# Patient Record
Sex: FEMALE | Race: WHITE | NOT HISPANIC OR LATINO | URBAN - METROPOLITAN AREA
[De-identification: names, ages, dates, MRNs, and addresses within clinical notes are randomized per-mention and may not be internally consistent; named-entity substitution may affect disease eponyms.]

---

## 2017-05-05 ENCOUNTER — APPOINTMENT (OUTPATIENT)
Dept: URBAN - METROPOLITAN AREA CLINIC 200 | Age: 57
Setting detail: DERMATOLOGY
End: 2017-05-11

## 2017-05-05 DIAGNOSIS — L57.8 OTHER SKIN CHANGES DUE TO CHRONIC EXPOSURE TO NONIONIZING RADIATION: ICD-10-CM

## 2017-05-05 DIAGNOSIS — L82.0 INFLAMED SEBORRHEIC KERATOSIS: ICD-10-CM

## 2017-05-05 DIAGNOSIS — L57.0 ACTINIC KERATOSIS: ICD-10-CM

## 2017-05-05 DIAGNOSIS — L72.0 EPIDERMAL CYST: ICD-10-CM

## 2017-05-05 PROCEDURE — OTHER COUNSELING: OTHER

## 2017-05-05 PROCEDURE — OTHER BENIGN DESTRUCTION COSMETIC: OTHER

## 2017-05-05 PROCEDURE — 17000 DESTRUCT PREMALG LESION: CPT

## 2017-05-05 PROCEDURE — OTHER LIQUID NITROGEN (COSMETIC): OTHER

## 2017-05-05 PROCEDURE — 99213 OFFICE O/P EST LOW 20 MIN: CPT | Mod: 25

## 2017-05-05 PROCEDURE — OTHER LIQUID NITROGEN: OTHER

## 2017-05-05 ASSESSMENT — LOCATION SIMPLE DESCRIPTION DERM
LOCATION SIMPLE: UPPER BACK
LOCATION SIMPLE: LEFT CHEEK
LOCATION SIMPLE: GLABELLA
LOCATION SIMPLE: RIGHT UPPER BACK
LOCATION SIMPLE: LEFT FOREHEAD
LOCATION SIMPLE: RIGHT SCALP

## 2017-05-05 ASSESSMENT — LOCATION ZONE DERM
LOCATION ZONE: FACE
LOCATION ZONE: SCALP
LOCATION ZONE: TRUNK
LOCATION ZONE: TRUNK

## 2017-05-05 ASSESSMENT — LOCATION DETAILED DESCRIPTION DERM
LOCATION DETAILED: RIGHT CENTRAL FRONTAL SCALP
LOCATION DETAILED: LEFT INFERIOR CENTRAL MALAR CHEEK
LOCATION DETAILED: LEFT INFERIOR MEDIAL FOREHEAD
LOCATION DETAILED: GLABELLA
LOCATION DETAILED: INFERIOR THORACIC SPINE
LOCATION DETAILED: RIGHT SUPERIOR MEDIAL UPPER BACK

## 2017-05-05 NOTE — PROCEDURE: LIQUID NITROGEN (COSMETIC)
Total Number Of Lesions Treated: 1
Detail Level: Zone
Consent: The patient's consent was obtained including but not limited to risks of crusting, scabbing, blistering, scarring, darker or lighter pigmentary change, recurrence, incomplete removal and infection.
Duration Of Freeze Thaw-Cycle (Seconds): 0
Number Of Freeze-Thaw Cycles: 2 freeze-thaw cycles
Price (Use Numbers Only, No Special Characters Or $): 75
Post-Care Instructions: I reviewed with the patient in detail post-care instructions. Patient is to wear sunprotection, and avoid picking at any of the treated lesions. Pt may apply Vaseline to crusted or scabbing areas.

## 2017-05-05 NOTE — PROCEDURE: LIQUID NITROGEN
Number Of Freeze-Thaw Cycles: 2 freeze-thaw cycles
Detail Level: Detailed
Render Post-Care Instructions In Note?: no
Consent: The patient's consent was obtained including but not limited to risks of crusting, scabbing, blistering, scarring, darker or lighter pigmentary change, recurrence, incomplete removal and infection.
Post-Care Instructions: I reviewed with the patient in detail post-care instructions. Patient is to wear sunprotection, and avoid picking at any of the treated lesions. Pt may apply Vaseline to crusted or scabbing areas.
Duration Of Freeze Thaw-Cycle (Seconds): 10

## 2017-05-05 NOTE — PROCEDURE: BENIGN DESTRUCTION COSMETIC
Anesthesia Volume In Cc: 0
Detail Level: Simple
Consent: The patient's consent was obtained including but not limited to risks of crusting, scabbing, blistering, scarring, darker or lighter pigmentary change, recurrence, incomplete removal and infection.
Post-Care Instructions: I reviewed with the patient in detail post-care instructions. Patient is to wear sunprotection, and avoid picking at any of the treated lesions. Pt may apply Vaseline to crusted or scabbing areas.

## 2018-06-01 ENCOUNTER — APPOINTMENT (OUTPATIENT)
Dept: URBAN - METROPOLITAN AREA CLINIC 200 | Age: 58
Setting detail: DERMATOLOGY
End: 2018-06-13

## 2018-06-01 DIAGNOSIS — L57.8 OTHER SKIN CHANGES DUE TO CHRONIC EXPOSURE TO NONIONIZING RADIATION: ICD-10-CM

## 2018-06-01 DIAGNOSIS — B07.8 OTHER VIRAL WARTS: ICD-10-CM

## 2018-06-01 DIAGNOSIS — D18.0 HEMANGIOMA: ICD-10-CM

## 2018-06-01 PROBLEM — D18.01 HEMANGIOMA OF SKIN AND SUBCUTANEOUS TISSUE: Status: ACTIVE | Noted: 2018-06-01

## 2018-06-01 PROBLEM — L57.0 ACTINIC KERATOSIS: Status: ACTIVE | Noted: 2018-06-01

## 2018-06-01 PROCEDURE — 17110 DESTRUCT B9 LESION 1-14: CPT

## 2018-06-01 PROCEDURE — OTHER LIQUID NITROGEN: OTHER

## 2018-06-01 PROCEDURE — OTHER REASSURANCE: OTHER

## 2018-06-01 PROCEDURE — OTHER COUNSELING: OTHER

## 2018-06-01 PROCEDURE — 99213 OFFICE O/P EST LOW 20 MIN: CPT | Mod: 25

## 2018-06-01 ASSESSMENT — LOCATION DETAILED DESCRIPTION DERM
LOCATION DETAILED: LEFT INDEX FINGER DISTAL INTERPHALANGEAL JOINT
LOCATION DETAILED: MONS
LOCATION DETAILED: RIGHT LABIA MAJORA
LOCATION DETAILED: LEFT MEDIAL SUPERIOR CHEST

## 2018-06-01 ASSESSMENT — LOCATION ZONE DERM
LOCATION ZONE: TRUNK
LOCATION ZONE: VULVA
LOCATION ZONE: FINGER
LOCATION ZONE: VULVA

## 2018-06-01 ASSESSMENT — LOCATION SIMPLE DESCRIPTION DERM
LOCATION SIMPLE: CHEST
LOCATION SIMPLE: LEFT INDEX FINGER
LOCATION SIMPLE: VULVA
LOCATION SIMPLE: VULVA

## 2018-06-01 NOTE — PROCEDURE: LIQUID NITROGEN
Post-Care Instructions: I reviewed with the patient in detail post-care instructions. Patient is to wear sunprotection, and avoid picking at any of the treated lesions. Pt may apply Vaseline to crusted or scabbing areas.
Render Post-Care Instructions In Note?: no
Include Z78.9 (Other Specified Conditions Influencing Health Status) As An Associated Diagnosis?: Yes
Detail Level: Detailed
Number Of Freeze-Thaw Cycles: 2 freeze-thaw cycles
Medical Necessity Information: It is in your best interest to select a reason for this procedure from the list below. All of these items fulfill various CMS LCD requirements except the new and changing color options.
Pared With?: Dermablade
Consent: The patient's consent was obtained including but not limited to risks of crusting, scabbing, blistering, scarring, darker or lighter pigmentary change, recurrence, incomplete removal and infection.
Medical Necessity Clause: This procedure was medically necessary because the lesions that were treated were: causing pain

## 2018-08-07 ENCOUNTER — TRANSCRIBE ORDERS (OUTPATIENT)
Dept: SCHEDULING | Age: 58
End: 2018-08-07

## 2018-08-07 DIAGNOSIS — R10.814 LEFT LOWER QUADRANT ABDOMINAL TENDERNESS: Primary | ICD-10-CM

## 2018-08-07 DIAGNOSIS — Z12.31 ENCOUNTER FOR SCREENING MAMMOGRAM FOR MALIGNANT NEOPLASM OF BREAST: ICD-10-CM

## 2018-08-07 DIAGNOSIS — R10.32 LEFT LOWER QUADRANT PAIN: ICD-10-CM

## 2018-08-16 ENCOUNTER — HOSPITAL ENCOUNTER (OUTPATIENT)
Dept: RADIOLOGY | Facility: HOSPITAL | Age: 58
Discharge: HOME | End: 2018-08-16
Attending: OBSTETRICS & GYNECOLOGY
Payer: COMMERCIAL

## 2018-08-16 DIAGNOSIS — R10.814 LEFT LOWER QUADRANT ABDOMINAL TENDERNESS: ICD-10-CM

## 2018-08-16 DIAGNOSIS — R10.32 LEFT LOWER QUADRANT PAIN: ICD-10-CM

## 2018-08-16 DIAGNOSIS — Z12.31 ENCOUNTER FOR SCREENING MAMMOGRAM FOR MALIGNANT NEOPLASM OF BREAST: ICD-10-CM

## 2018-08-16 PROCEDURE — 76856 US EXAM PELVIC COMPLETE: CPT

## 2018-08-16 PROCEDURE — 77067 SCR MAMMO BI INCL CAD: CPT

## 2018-08-21 ENCOUNTER — TRANSCRIBE ORDERS (OUTPATIENT)
Dept: SCHEDULING | Age: 58
End: 2018-08-21

## 2018-08-21 DIAGNOSIS — R92.8 OTHER ABNORMAL AND INCONCLUSIVE FINDINGS ON DIAGNOSTIC IMAGING OF BREAST: Primary | ICD-10-CM

## 2018-08-22 ENCOUNTER — HOSPITAL ENCOUNTER (OUTPATIENT)
Dept: RADIOLOGY | Facility: HOSPITAL | Age: 58
Discharge: HOME | End: 2018-08-22
Attending: RADIOLOGY
Payer: COMMERCIAL

## 2018-08-22 DIAGNOSIS — R92.8 OTHER ABNORMAL AND INCONCLUSIVE FINDINGS ON DIAGNOSTIC IMAGING OF BREAST: ICD-10-CM

## 2018-08-22 PROCEDURE — 76642 ULTRASOUND BREAST LIMITED: CPT | Mod: RT

## 2018-08-22 PROCEDURE — G0279 TOMOSYNTHESIS, MAMMO: HCPCS | Mod: RT

## 2018-10-05 ENCOUNTER — APPOINTMENT (OUTPATIENT)
Dept: URBAN - METROPOLITAN AREA CLINIC 200 | Age: 58
Setting detail: DERMATOLOGY
End: 2018-10-10

## 2018-10-05 DIAGNOSIS — S1092XA BLISTER OF FACE, NECK, AND SCALP EXCEPT EYE, WITHOUT MENTION OF INFECTION: ICD-10-CM

## 2018-10-05 DIAGNOSIS — S0032XA BLISTER OF FACE, NECK, AND SCALP EXCEPT EYE, WITHOUT MENTION OF INFECTION: ICD-10-CM

## 2018-10-05 DIAGNOSIS — S0002XA BLISTER OF FACE, NECK, AND SCALP EXCEPT EYE, WITHOUT MENTION OF INFECTION: ICD-10-CM

## 2018-10-05 DIAGNOSIS — S00429A BLISTER OF FACE, NECK, AND SCALP EXCEPT EYE, WITHOUT MENTION OF INFECTION: ICD-10-CM

## 2018-10-05 DIAGNOSIS — S1012XA BLISTER OF FACE, NECK, AND SCALP EXCEPT EYE, WITHOUT MENTION OF INFECTION: ICD-10-CM

## 2018-10-05 DIAGNOSIS — B07.8 OTHER VIRAL WARTS: ICD-10-CM

## 2018-10-05 DIAGNOSIS — S00522A BLISTER OF FACE, NECK, AND SCALP EXCEPT EYE, WITHOUT MENTION OF INFECTION: ICD-10-CM

## 2018-10-05 DIAGNOSIS — I78.8 OTHER DISEASES OF CAPILLARIES: ICD-10-CM

## 2018-10-05 DIAGNOSIS — S00521A BLISTER OF FACE, NECK, AND SCALP EXCEPT EYE, WITHOUT MENTION OF INFECTION: ICD-10-CM

## 2018-10-05 DIAGNOSIS — S0092XA BLISTER OF FACE, NECK, AND SCALP EXCEPT EYE, WITHOUT MENTION OF INFECTION: ICD-10-CM

## 2018-10-05 PROBLEM — S60.420A BLISTER (NONTHERMAL) OF RIGHT INDEX FINGER, INITIAL ENCOUNTER: Status: ACTIVE | Noted: 2018-10-05

## 2018-10-05 PROCEDURE — OTHER BENIGN DESTRUCTION COSMETIC MULTI: OTHER

## 2018-10-05 PROCEDURE — OTHER COUNSELING: OTHER

## 2018-10-05 PROCEDURE — OTHER REASSURANCE: OTHER

## 2018-10-05 ASSESSMENT — LOCATION DETAILED DESCRIPTION DERM
LOCATION DETAILED: RIGHT DISTAL PALMAR INDEX FINGER
LOCATION DETAILED: RIGHT MEDIAL MALAR CHEEK
LOCATION DETAILED: RIGHT SUPERIOR LATERAL BUCCAL CHEEK
LOCATION DETAILED: RIGHT INFERIOR MEDIAL MALAR CHEEK
LOCATION DETAILED: LEFT INFERIOR CENTRAL MALAR CHEEK
LOCATION DETAILED: RIGHT CHIN
LOCATION DETAILED: LEFT CENTRAL MALAR CHEEK
LOCATION DETAILED: NASAL DORSUM
LOCATION DETAILED: LEFT NASAL ALA
LOCATION DETAILED: RIGHT CENTRAL MALAR CHEEK
LOCATION DETAILED: GLABELLA
LOCATION DETAILED: LEFT INDEX FINGER DISTAL INTERPHALANGEAL JOINT

## 2018-10-05 ASSESSMENT — LOCATION SIMPLE DESCRIPTION DERM
LOCATION SIMPLE: RIGHT CHEEK
LOCATION SIMPLE: LEFT CHEEK
LOCATION SIMPLE: CHIN
LOCATION SIMPLE: RIGHT INDEX FINGER
LOCATION SIMPLE: NOSE
LOCATION SIMPLE: LEFT INDEX FINGER
LOCATION SIMPLE: LEFT NOSE
LOCATION SIMPLE: GLABELLA

## 2018-10-05 ASSESSMENT — LOCATION ZONE DERM
LOCATION ZONE: NOSE
LOCATION ZONE: FINGER
LOCATION ZONE: FACE

## 2018-10-05 NOTE — PROCEDURE: BENIGN DESTRUCTION COSMETIC MULTI
Post-Care Instructions: I reviewed with the patient in detail post-care instructions. Patient is to wear sunprotection, and avoid picking at any of the treated lesions. Pt may apply Vaseline to crusted or scabbing areas.
Price (Use Numbers Only, No Special Characters Or $): 75
Total Number Of Lesions Treated: 10
Consent: The patient's consent was obtained including but not limited to risks of crusting, scabbing, blistering, scarring, darker or lighter pigmentary change, recurrence, incomplete removal and infection.
Detail Level: Zone
Anesthesia Volume In Cc: 0

## 2018-10-05 NOTE — PROCEDURE: COUNSELING
Detail Level: Detailed
Patient Specific Counseling (Will Not Stick From Patient To Patient): Pared top layer

## 2019-02-04 ENCOUNTER — TRANSCRIBE ORDERS (OUTPATIENT)
Dept: SCHEDULING | Age: 59
End: 2019-02-04

## 2019-02-04 DIAGNOSIS — R92.8 OTHER ABNORMAL AND INCONCLUSIVE FINDINGS ON DIAGNOSTIC IMAGING OF BREAST: Primary | ICD-10-CM

## 2019-02-18 ENCOUNTER — TRANSCRIBE ORDERS (OUTPATIENT)
Dept: RADIOLOGY | Age: 59
End: 2019-02-18

## 2019-02-18 ENCOUNTER — HOSPITAL ENCOUNTER (OUTPATIENT)
Dept: RADIOLOGY | Age: 59
Discharge: HOME | End: 2019-02-18
Attending: OBSTETRICS & GYNECOLOGY
Payer: COMMERCIAL

## 2019-02-18 ENCOUNTER — HOSPITAL ENCOUNTER (OUTPATIENT)
Dept: RADIOLOGY | Age: 59
Discharge: HOME | End: 2019-02-18
Attending: RADIOLOGY
Payer: COMMERCIAL

## 2019-02-18 DIAGNOSIS — R92.8 OTHER ABNORMAL AND INCONCLUSIVE FINDINGS ON DIAGNOSTIC IMAGING OF BREAST: Primary | ICD-10-CM

## 2019-02-18 DIAGNOSIS — R92.8 OTHER ABNORMAL AND INCONCLUSIVE FINDINGS ON DIAGNOSTIC IMAGING OF BREAST: ICD-10-CM

## 2019-02-18 PROCEDURE — 76642 ULTRASOUND BREAST LIMITED: CPT | Mod: RT

## 2019-02-18 PROCEDURE — 77065 DX MAMMO INCL CAD UNI: CPT | Mod: RT

## 2019-05-01 ENCOUNTER — OFFICE VISIT (OUTPATIENT)
Dept: FAMILY MEDICINE | Facility: CLINIC | Age: 59
End: 2019-05-01
Payer: COMMERCIAL

## 2019-05-01 ENCOUNTER — APPOINTMENT (OUTPATIENT)
Dept: LAB | Age: 59
End: 2019-05-01
Attending: FAMILY MEDICINE
Payer: COMMERCIAL

## 2019-05-01 VITALS
OXYGEN SATURATION: 98 % | HEART RATE: 66 BPM | SYSTOLIC BLOOD PRESSURE: 112 MMHG | TEMPERATURE: 97.6 F | DIASTOLIC BLOOD PRESSURE: 68 MMHG | WEIGHT: 190.8 LBS

## 2019-05-01 DIAGNOSIS — M25.50 ARTHRALGIA, UNSPECIFIED JOINT: ICD-10-CM

## 2019-05-01 DIAGNOSIS — R59.1 LYMPHADENOPATHY: ICD-10-CM

## 2019-05-01 DIAGNOSIS — Z11.59 NEED FOR HEPATITIS C SCREENING TEST: ICD-10-CM

## 2019-05-01 DIAGNOSIS — R53.83 FATIGUE, UNSPECIFIED TYPE: ICD-10-CM

## 2019-05-01 DIAGNOSIS — R53.83 FATIGUE, UNSPECIFIED TYPE: Primary | ICD-10-CM

## 2019-05-01 PROBLEM — G43.909 MIGRAINE HEADACHE: Status: ACTIVE | Noted: 2019-05-01

## 2019-05-01 LAB
ALBUMIN SERPL-MCNC: 4.1 G/DL (ref 3.4–5)
ALP SERPL-CCNC: 88 IU/L (ref 35–126)
ALT SERPL-CCNC: 19 IU/L (ref 11–54)
ANION GAP SERPL CALC-SCNC: 13 MEQ/L (ref 3–15)
AST SERPL-CCNC: 22 IU/L (ref 15–41)
BASOPHILS # BLD: 0 K/UL (ref 0.01–0.1)
BASOPHILS NFR BLD: 0 %
BILIRUB SERPL-MCNC: 0.7 MG/DL (ref 0.3–1.2)
BUN SERPL-MCNC: 9 MG/DL (ref 8–20)
CALCIUM SERPL-MCNC: 9.5 MG/DL (ref 8.9–10.3)
CHLORIDE SERPL-SCNC: 101 MEQ/L (ref 98–109)
CO2 SERPL-SCNC: 23 MEQ/L (ref 22–32)
CREAT SERPL-MCNC: 0.9 MG/DL
DIFFERENTIAL METHOD BLD: ABNORMAL
EOSINOPHIL # BLD: 0.06 K/UL (ref 0.04–0.36)
EOSINOPHIL NFR BLD: 1 %
ERYTHROCYTE [DISTWIDTH] IN BLOOD BY AUTOMATED COUNT: 13.2 % (ref 11.7–14.4)
ERYTHROCYTE [SEDIMENTATION RATE] IN BLOOD BY WESTERGREN METHOD: 33 MM/HR
GFR SERPL CREATININE-BSD FRML MDRD: >60 ML/MIN/1.73M*2
GLUCOSE SERPL-MCNC: 86 MG/DL (ref 70–99)
HCT VFR BLDCO AUTO: 43.7 %
HGB BLD-MCNC: 14.3 G/DL
LYMPHOCYTES # BLD: 1.8 K/UL (ref 1.2–3.5)
LYMPHOCYTES NFR BLD: 31 %
MCH RBC QN AUTO: 28.9 PG (ref 28–33.2)
MCHC RBC AUTO-ENTMCNC: 32.7 G/DL (ref 32.2–35.5)
MCV RBC AUTO: 88.3 FL (ref 83–98)
MONOCYTES # BLD: 0.35 K/UL (ref 0.28–0.8)
MONOCYTES NFR BLD: 6 %
NEUTS BAND # BLD: 3.6 K/UL (ref 1.7–7)
NEUTS SEG NFR BLD: 62 %
PDW BLD AUTO: 10.2 FL (ref 9.4–12.3)
PLAT MORPH BLD: NORMAL
PLATELET # BLD AUTO: 249 K/UL
PLATELET # BLD EST: ABNORMAL 10*3/UL
POTASSIUM SERPL-SCNC: 5 MEQ/L (ref 3.6–5.1)
PROT SERPL-MCNC: 6.3 G/DL (ref 6–8.2)
RBC # BLD AUTO: 4.95 M/UL (ref 3.93–5.22)
RBC MORPH BLD: NORMAL
SODIUM SERPL-SCNC: 137 MEQ/L (ref 136–144)
TSH SERPL DL<=0.05 MIU/L-ACNC: 0.18 MIU/L (ref 0.34–5.6)
WBC # BLD AUTO: 5.81 K/UL

## 2019-05-01 PROCEDURE — 99214 OFFICE O/P EST MOD 30 MIN: CPT | Performed by: FAMILY MEDICINE

## 2019-05-01 PROCEDURE — 86803 HEPATITIS C AB TEST: CPT

## 2019-05-01 PROCEDURE — 36415 COLL VENOUS BLD VENIPUNCTURE: CPT

## 2019-05-01 PROCEDURE — 84443 ASSAY THYROID STIM HORMONE: CPT

## 2019-05-01 PROCEDURE — 80053 COMPREHEN METABOLIC PANEL: CPT

## 2019-05-01 PROCEDURE — 85652 RBC SED RATE AUTOMATED: CPT

## 2019-05-01 PROCEDURE — 85025 COMPLETE CBC W/AUTO DIFF WBC: CPT

## 2019-05-01 RX ORDER — SUMATRIPTAN SUCCINATE 50 MG/1
50 TABLET ORAL
COMMUNITY
Start: 2017-08-19 | End: 2019-05-01

## 2019-05-01 RX ORDER — ERGOCALCIFEROL 1.25 MG/1
CAPSULE ORAL
COMMUNITY
Start: 2019-01-29 | End: 2020-08-13

## 2019-05-01 RX ORDER — LEVOTHYROXINE SODIUM 75 UG/1
75 TABLET ORAL EVERY OTHER DAY
COMMUNITY

## 2019-05-01 RX ORDER — ACETAMINOPHEN 500 MG
TABLET ORAL
COMMUNITY
Start: 2016-07-23 | End: 2020-08-13

## 2019-05-01 ASSESSMENT — ENCOUNTER SYMPTOMS
FEVER: 0
VOICE CHANGE: 0
HEADACHES: 0
ADENOPATHY: 0
CONSTIPATION: 0
SLEEP DISTURBANCE: 0
CHEST TIGHTNESS: 0
LIGHT-HEADEDNESS: 0
FATIGUE: 1
DYSURIA: 0
AGITATION: 0
COUGH: 0
SHORTNESS OF BREATH: 0
NUMBNESS: 0
BACK PAIN: 0
SORE THROAT: 0
NAUSEA: 0
PALPITATIONS: 0
CHILLS: 0
NECK STIFFNESS: 0
ARTHRALGIAS: 1
NECK PAIN: 0
VOMITING: 0
DIFFICULTY URINATING: 0
DYSPHORIC MOOD: 0
ABDOMINAL PAIN: 0
WEAKNESS: 0
NERVOUS/ANXIOUS: 0
DIARRHEA: 0
DIAPHORESIS: 0
WHEEZING: 0
RHINORRHEA: 0
APPETITE CHANGE: 0

## 2019-05-01 NOTE — PROGRESS NOTES
[facility] at [facility]     Reason for visit:   Chief Complaint   Patient presents with   • Swollen Glands - facial puffiness on right side     feeling bloated, hurts to move neck, muscle aches          HPI  Hilda Webster is a 59 y.o. female  Multiple vague complaints- 1 for the last week has been feeling fatigued- easily winded more than usual- feels tired overall and has been achy in general   today noted swelling under rt side of chin on neck - felt feverish in the AM temp was 98.9  Hands have been achy in general but not today- had hx inflammatory OA.           Review of Systems   Constitutional: Positive for fatigue. Negative for appetite change, chills, diaphoresis and fever.   HENT: Negative for congestion, rhinorrhea, sore throat, tinnitus and voice change.    Respiratory: Negative for cough, chest tightness, shortness of breath and wheezing.    Cardiovascular: Negative for chest pain, palpitations and leg swelling.   Gastrointestinal: Negative for abdominal pain, constipation, diarrhea, nausea and vomiting.   Genitourinary: Negative for difficulty urinating, dysuria and urgency.   Musculoskeletal: Positive for arthralgias. Negative for back pain, neck pain and neck stiffness.   Skin: Negative for rash.   Neurological: Negative for syncope, weakness, light-headedness, numbness and headaches.   Hematological: Negative for adenopathy.   Psychiatric/Behavioral: Negative for agitation, dysphoric mood and sleep disturbance. The patient is not nervous/anxious.       The following have been reviewed and updated as appropriate in this visit:  Allergies  Meds  Problems         No past medical history on file.  Past Surgical History:   Procedure Laterality Date   •  SECTION     • ENDOMETRIAL ABLATION     • THYROIDECTOMY       Family History   Problem Relation Age of Onset   • Cervical cancer Mother    • Diabetes Father    • No Known Problems Sister    • Uterine cancer Paternal Grandmother     • Lung cancer Paternal Grandfather    • Migraines Sister      Social History   Substance Use Topics   • Smoking status: Former Smoker   • Smokeless tobacco: Never Used   • Alcohol use Yes      Comment: occasionally       Allergies   Allergen Reactions   • Ibuprofen Hives   • Naproxen Hives     No current outpatient prescriptions on file prior to visit.     No current facility-administered medications on file prior to visit.       Objective   Vitals:    05/01/19 1201   BP: 112/68   BP Location: Left upper arm   Patient Position: Sitting   Pulse: 66   Temp: 36.4 °C (97.6 °F)  Comment: Pt was drinking ice tea   TempSrc: Oral   SpO2: 98%   Weight: 86.5 kg (190 lb 12.8 oz)     Physical Exam   Constitutional: She is oriented to person, place, and time. She appears well-developed and well-nourished. No distress.   HENT:   Head: Normocephalic and atraumatic.   Right Ear: Tympanic membrane, external ear and ear canal normal.   Left Ear: Tympanic membrane, external ear and ear canal normal.   Nose: Nose normal.   Mouth/Throat: Oropharynx is clear and moist. No oropharyngeal exudate.   Eyes: Pupils are equal, round, and reactive to light. Conjunctivae and EOM are normal. Right eye exhibits no discharge. Left eye exhibits no discharge. No scleral icterus.   Neck: Normal range of motion. Neck supple. No JVD present. No thyromegaly present.   Cardiovascular: Normal rate, regular rhythm, normal heart sounds and intact distal pulses.  Exam reveals no gallop, no friction rub and no decreased pulses.    No murmur heard.  No carotid bruits noted   Pulmonary/Chest: Effort normal and breath sounds normal. No respiratory distress. She has no wheezes. She has no rales. She exhibits no tenderness.   Abdominal: Soft. She exhibits no distension and no mass. There is no tenderness. There is no rebound and no guarding. No hernia.   Musculoskeletal: Normal range of motion. She exhibits no edema, tenderness or deformity.   Lymphadenopathy:      She has no cervical adenopathy.   Neurological: She is alert and oriented to person, place, and time. She has normal reflexes. She displays normal reflexes. No cranial nerve deficit or sensory deficit. She exhibits normal muscle tone. Coordination and gait normal.   Skin: Skin is warm and dry. Capillary refill takes less than 2 seconds. No lesion and no rash noted. She is not diaphoretic. No erythema. No pallor.   Psychiatric: She has a normal mood and affect. Her behavior is normal.   Vitals reviewed.               Assessment     Visit Diagnosis    Problem List Items Addressed This Visit     None      Visit Diagnoses     Fatigue, unspecified type    -  Primary    Relevant Orders    CBC and Differential (Completed)    TSH 3rd Generation    Comprehensive metabolic panel    Lymphadenopathy        Relevant Orders    CBC and Differential (Completed)    Sedimentation rate (Completed)    Arthralgia, unspecified joint        Relevant Orders    CBC and Differential (Completed)    Sedimentation rate (Completed)    Need for hepatitis C screening test        Relevant Orders    Hepatitis C antibody      likely viral illness  Will check labs as ordered  She should f/u if sympt cont'd or if they evolve or change into additional sympt  Based on her hx of inflammatory arthritis, should f/u with rheumatology         Return if symptoms worsen or fail to improve.    Patient has been educated on the risks, benefits, and side effects of all medication.     Tony Hill, DO  5/1/2019

## 2019-05-02 ENCOUNTER — TELEPHONE (OUTPATIENT)
Dept: FAMILY MEDICINE | Facility: CLINIC | Age: 59
End: 2019-05-02

## 2019-05-02 LAB — HCV AB SER QL: NONREACTIVE

## 2019-05-02 NOTE — TELEPHONE ENCOUNTER
----- Message from Tony Hill, DO sent at 5/2/2019  1:00 PM EDT -----  pls advise- labs overall look ok- TSH is low  Which she can discuss with her endocrinologist- pls forward result  Slight elevateion of her sed rate which can be from inflammation (arthritis)   otherwise no other abn findings- should self resolve- f/u with PCP if not improving in the next week or so - sooner prn

## 2019-05-03 ENCOUNTER — TELEPHONE (OUTPATIENT)
Dept: FAMILY MEDICINE | Facility: CLINIC | Age: 59
End: 2019-05-03

## 2019-05-03 NOTE — TELEPHONE ENCOUNTER
----- Message from Tony Hill DO sent at 5/3/2019 11:39 AM EDT -----  Regarding: FW: Test Results Question  Contact: [phone]      ----- Message -----  From: Deya Markham RN  Sent: 5/3/2019  10:57 AM  To: Tony Hill DO  Subject: FW: Test Results Question                            ----- Message -----  From: Hilda Webster  Sent: 5/3/2019  10:56 AM  To: [facility]  Subject: Test Results Question                            Dr. Hill, I received a voicemail message from your nurse telling me that my T3 was low.  I called back and Kristin gave me the results of the test which was .18.  I talked to Dr. Abreu, my endocrinologist, who was able to get the results from [facility] and she told me that my TSH was low and that T3 wasn't included in the test.  This is a big difference.  It turns out I'm hyperthyroid.  I don't know how 2 people were able to talk to me about a T3 result and neither one said TSH.  I would appreciate it if you can assure me that all notes in my file correlate with low TSH and not T3.  Also, I have been unable to access my lab results on [facility].  Do you have to release them first in order for me to have access?  Thank you for looking into this.

## 2019-08-27 ENCOUNTER — TELEPHONE (OUTPATIENT)
Dept: FAMILY MEDICINE | Facility: CLINIC | Age: 59
End: 2019-08-27

## 2019-08-30 ENCOUNTER — TRANSCRIBE ORDERS (OUTPATIENT)
Dept: SCHEDULING | Age: 59
End: 2019-08-30

## 2019-08-30 DIAGNOSIS — Z87.898 PERSONAL HISTORY OF OTHER SPECIFIED CONDITIONS: Primary | ICD-10-CM

## 2019-09-09 ENCOUNTER — APPOINTMENT (OUTPATIENT)
Dept: URBAN - METROPOLITAN AREA CLINIC 200 | Age: 59
Setting detail: DERMATOLOGY
End: 2019-09-11

## 2019-09-09 DIAGNOSIS — L82.0 INFLAMED SEBORRHEIC KERATOSIS: ICD-10-CM

## 2019-09-09 DIAGNOSIS — L57.8 OTHER SKIN CHANGES DUE TO CHRONIC EXPOSURE TO NONIONIZING RADIATION: ICD-10-CM

## 2019-09-09 PROBLEM — D23.5 OTHER BENIGN NEOPLASM OF SKIN OF TRUNK: Status: ACTIVE | Noted: 2019-09-09

## 2019-09-09 PROCEDURE — 99213 OFFICE O/P EST LOW 20 MIN: CPT

## 2019-09-09 PROCEDURE — OTHER REASSURANCE: OTHER

## 2019-09-09 PROCEDURE — OTHER COUNSELING: OTHER

## 2019-09-09 PROCEDURE — OTHER MIPS QUALITY: OTHER

## 2019-09-09 ASSESSMENT — LOCATION SIMPLE DESCRIPTION DERM
LOCATION SIMPLE: CHEST
LOCATION SIMPLE: LEFT BREAST

## 2019-09-09 ASSESSMENT — LOCATION DETAILED DESCRIPTION DERM
LOCATION DETAILED: LEFT MEDIAL BREAST 7-8:00 REGION
LOCATION DETAILED: LEFT MEDIAL SUPERIOR CHEST

## 2019-09-09 ASSESSMENT — LOCATION ZONE DERM: LOCATION ZONE: TRUNK

## 2019-09-09 NOTE — PROCEDURE: MIPS QUALITY
Detail Level: Detailed
Additional Notes: Shingles SHINGRIX vaccine not administered due to patients personal or medical reasons. Patient declined to elaborate on those reasons.
Quality 474: Zoster Vaccination Status: Shingrix vaccine was not administered for reasons documented by clinician (e.g. patient administered vaccine other than Shingrix, patient allergy or other medical reasons, patient declined or other patient reasons, vaccine not available or other system reasons)

## 2019-09-11 ENCOUNTER — HOSPITAL ENCOUNTER (OUTPATIENT)
Dept: RADIOLOGY | Facility: HOSPITAL | Age: 59
Discharge: HOME | End: 2019-09-11
Attending: OBSTETRICS & GYNECOLOGY
Payer: COMMERCIAL

## 2019-09-11 ENCOUNTER — HOSPITAL ENCOUNTER (OUTPATIENT)
Dept: RADIOLOGY | Age: 59
End: 2019-09-11
Attending: OBSTETRICS & GYNECOLOGY
Payer: COMMERCIAL

## 2019-09-11 DIAGNOSIS — Z87.898 PERSONAL HISTORY OF OTHER SPECIFIED CONDITIONS: ICD-10-CM

## 2019-09-11 PROCEDURE — G0279 TOMOSYNTHESIS, MAMMO: HCPCS

## 2019-09-11 PROCEDURE — 76642 ULTRASOUND BREAST LIMITED: CPT | Mod: RT

## 2020-02-12 ENCOUNTER — OFFICE VISIT (OUTPATIENT)
Dept: FAMILY MEDICINE | Facility: CLINIC | Age: 60
End: 2020-02-12
Payer: COMMERCIAL

## 2020-02-12 VITALS
BODY MASS INDEX: 32.76 KG/M2 | HEIGHT: 62 IN | HEART RATE: 68 BPM | SYSTOLIC BLOOD PRESSURE: 106 MMHG | WEIGHT: 178 LBS | OXYGEN SATURATION: 98 % | DIASTOLIC BLOOD PRESSURE: 62 MMHG | TEMPERATURE: 98.4 F

## 2020-02-12 DIAGNOSIS — E89.0 HYPOTHYROIDISM, POSTSURGICAL: ICD-10-CM

## 2020-02-12 DIAGNOSIS — M89.9 BONE LESION: Primary | ICD-10-CM

## 2020-02-12 DIAGNOSIS — R07.9 CHEST PAIN, UNSPECIFIED TYPE: ICD-10-CM

## 2020-02-12 PROCEDURE — 99214 OFFICE O/P EST MOD 30 MIN: CPT | Performed by: FAMILY MEDICINE

## 2020-02-12 RX ORDER — LEVOTHYROXINE SODIUM 50 UG/1
50 TABLET ORAL EVERY OTHER DAY
COMMUNITY
Start: 2019-11-21

## 2020-02-12 RX ORDER — PANTOPRAZOLE SODIUM 40 MG/1
40 TABLET, DELAYED RELEASE ORAL
COMMUNITY
Start: 2020-02-08 | End: 2020-03-09

## 2020-02-12 ASSESSMENT — ENCOUNTER SYMPTOMS
HEADACHES: 0
BACK PAIN: 0
DIAPHORESIS: 1
FEVER: 0
SORE THROAT: 0
SHORTNESS OF BREATH: 0
ABDOMINAL PAIN: 0

## 2020-02-12 NOTE — ASSESSMENT & PLAN NOTE
Continue same dose levothyroxine for now and get labs done as planned. I suspect that her jitteriness is due to a decongestant in the allegra, not her levothyroxine dose

## 2020-02-12 NOTE — PROGRESS NOTES
[facility] at [facility]     Reason for visit:   Chief Complaint   Patient presents with   • Hospital F/UP      HPI  Hypothyroidism: taking medication regularly. Sees Dr Abreu and will be having labs again soon    She is feeling stressed and has been having hives. (she had chronic urticaria) years ago but then found them to be due to aleve. She thinks it may have been to eating too many tomatoes. (has happened in the past when eating too many tomatoes). She is taking Allegra (not sure, but thinks it is a 12 hour version which I suspect it may be allegra D. Feeling jittery and had trouble sleeping)    Was in the [facility] ER with chest pain. Was given a GI cocktail which resolved the pain. She had a CT scan which was suggestive of esophagitis. She was prescribed pantoprazole but hasn't started to take it yet. She has an appt to see GI ([place]) tomorrow.    The CT scan in the ED showed bony lesions in T and L spine      Hilda Webster is a 59 y.o. female      The following have been reviewed and updated as appropriate in this visit  Patient Active Problem List    Diagnosis Date Noted   • Hypothyroidism, postsurgical 2020   • Migraine headache 2019   • Episcleritis 2014   • Anemia 2011   • Rosacea 2011   • History of thyroid cancer 2011   • Vitamin D deficiency 2011     History reviewed. No pertinent past medical history.  Past Surgical History:   Procedure Laterality Date   •  SECTION     • ENDOMETRIAL ABLATION     • THYROIDECTOMY       Social History     Socioeconomic History   • Marital status:      Spouse name: Not on file   • Number of children: Not on file   • Years of education: Not on file   • Highest education level: Not on file   Occupational History   • Not on file   Social Needs   • Financial resource strain: Not on file   • Food insecurity:     Worry: Not on file     Inability: Not on file   • Transportation needs:      Medical: Not on file     Non-medical: Not on file   Tobacco Use   • Smoking status: Former Smoker   • Smokeless tobacco: Never Used   Substance and Sexual Activity   • Alcohol use: Yes     Comment: occasionally   • Drug use: Not on file   • Sexual activity: Not on file   Lifestyle   • Physical activity:     Days per week: Not on file     Minutes per session: Not on file   • Stress: Not on file   Relationships   • Social connections:     Talks on phone: Not on file     Gets together: Not on file     Attends [employer] service: Not on file     Active member of club or organization: Not on file     Attends meetings of clubs or organizations: Not on file     Relationship status: Not on file   • Intimate partner violence:     Fear of current or ex partner: Not on file     Emotionally abused: Not on file     Physically abused: Not on file     Forced sexual activity: Not on file   Other Topics Concern   • Not on file   Social History Narrative   • Not on file       Family History   Problem Relation Age of Onset   • Cervical cancer Biological Mother    • Diabetes Biological Father    • No Known Problems Biological Sister    • Uterine cancer Paternal Grandmother    • Lung cancer Paternal Grandfather    • Migraines Biological Sister        Allergies   Allergen Reactions   • Excedrin Extra Strength [Aspirin-Acetaminophen-Caffeine] Hives     Hives, Facial Swelling    • Ibuprofen Hives     Hives, Facial Swelling    • Naproxen Hives     Hives, Facial Swelling        Current Outpatient Medications   Medication Sig Dispense Refill   • cannabidiol (CBD) oil 1 each as needed.       • ergocalciferol (VITAMIN D2) 50,000 unit capsule      • levothyroxine (SYNTHROID) 75 mcg tablet Take 75 mcg by mouth every other day.       • pantoprazole (PROTONIX) 40 mg EC tablet Take 40 mg by mouth.     • SYNTHROID 50 mcg tablet Take 50 mcg by mouth every other day.       • cholecalciferol, vitamin D3, (VITAMIN D3) 5,000 unit tablet take 1 tablet by  "oral route  every week       No current facility-administered medications for this visit.      Review of Systems   Constitutional: Positive for diaphoresis (occasoinal hot flashes. No night sweats). Negative for fever.   HENT: Negative for sore throat.    Respiratory: Negative for shortness of breath.    Cardiovascular:        No more chest pain since discharge from ED   Gastrointestinal: Negative for abdominal pain.   Genitourinary: Negative for decreased urine volume.   Musculoskeletal: Negative for back pain.   Neurological: Negative for headaches.     Objective   Vitals:    02/12/20 0929   BP: 106/62   Pulse: 68   Temp: 36.9 °C (98.4 °F)   SpO2: 98%   Weight: 80.7 kg (178 lb)   Height: 1.562 m (5' 1.5\")     Body mass index is 33.09 kg/m².    Physical Exam   Constitutional: She appears well-developed and well-nourished. No distress.   HENT:   Head: Normocephalic and atraumatic.   Right Ear: Tympanic membrane and ear canal normal.   Left Ear: Tympanic membrane and ear canal normal.   Mouth/Throat: Oropharynx is clear and moist.   External nose normal   Eyes: Pupils are equal, round, and reactive to light. Conjunctivae and lids are normal.   Neck: No thyromegaly present.   Cardiovascular: Normal rate, regular rhythm and normal heart sounds. Exam reveals no gallop and no friction rub.   No murmur heard.  Pulmonary/Chest: Effort normal and breath sounds normal. She exhibits no deformity.   Abdominal: Soft. There is no tenderness.   Musculoskeletal: She exhibits no edema.   Lymphadenopathy:     She has no cervical adenopathy.   Neurological: She is alert.   Skin: No cyanosis. Nails show no clubbing.   Psychiatric: She has a normal mood and affect.     Procedures       POINT OF CARE TESTING:    No results found for this visit on 02/12/20.     Assessment   Diagnoses and all orders for this visit:    Bone lesion (Primary)  Comments:  check bone scan  Orders:  -     NUCLEAR MEDICINE BONE SCAN WHOLE BODY; Future    Chest " pain, unspecified type  Comments:  probably GI in origin. To GI tomorrow for consultation and then can determine need for pantoprazole    Hypothyroidism, postsurgical  Assessment & Plan:  Continue same dose levothyroxine for now and get labs done as planned. I suspect that her jitteriness is due to a decongestant in the allegra, not her levothyroxine dose         Educated patient on any new medications/doses that I prescribed today and patient expressed understanding and patient expressed understanding of and agreement with plan  No follow-ups on file.     Lu Genao MD  2/12/2020

## 2020-02-14 ENCOUNTER — HOSPITAL ENCOUNTER (OUTPATIENT)
Dept: RADIOLOGY | Facility: HOSPITAL | Age: 60
Setting detail: NUCLEAR MEDICINE
Discharge: HOME | End: 2020-02-14
Attending: FAMILY MEDICINE
Payer: COMMERCIAL

## 2020-02-14 DIAGNOSIS — M89.9 BONE LESION: ICD-10-CM

## 2020-02-14 PROCEDURE — A9503 TC99M MEDRONATE: HCPCS

## 2020-02-14 PROCEDURE — 78306 BONE IMAGING WHOLE BODY: CPT

## 2020-02-14 RX ADMIN — TECHNETIUM TC 99M MEDRONATE 26.1 MILLICURIE: 25 INJECTION, POWDER, FOR SOLUTION INTRAVENOUS at 11:00

## 2020-02-18 ENCOUNTER — TELEPHONE (OUTPATIENT)
Dept: FAMILY MEDICINE | Facility: CLINIC | Age: 60
End: 2020-02-18

## 2020-02-19 NOTE — TELEPHONE ENCOUNTER
I spoke with Hilda, I spoke with radiologist: bone scan does not show anything concerning. For completeness sake, she should get copy of old CT from [facility], take it to [facility] and ask them to compare to recent CT scan to make sure all has remained stable. She agreed with plan

## 2020-02-26 ENCOUNTER — TRANSCRIBE ORDERS (OUTPATIENT)
Dept: SCHEDULING | Age: 60
End: 2020-02-26

## 2020-02-26 DIAGNOSIS — Z92.89 PERSONAL HISTORY OF OTHER MEDICAL TREATMENT: Primary | ICD-10-CM

## 2020-02-26 DIAGNOSIS — R92.8 OTHER ABNORMAL AND INCONCLUSIVE FINDINGS ON DIAGNOSTIC IMAGING OF BREAST: ICD-10-CM

## 2020-03-04 ENCOUNTER — HOSPITAL ENCOUNTER (OUTPATIENT)
Dept: RADIOLOGY | Age: 60
Discharge: HOME | End: 2020-03-04
Attending: RADIOLOGY
Payer: COMMERCIAL

## 2020-03-04 ENCOUNTER — HOSPITAL ENCOUNTER (OUTPATIENT)
Dept: RADIOLOGY | Age: 60
Discharge: HOME | End: 2020-03-04
Attending: OBSTETRICS & GYNECOLOGY
Payer: COMMERCIAL

## 2020-03-04 ENCOUNTER — TRANSCRIBE ORDERS (OUTPATIENT)
Dept: RADIOLOGY | Age: 60
End: 2020-03-04

## 2020-03-04 DIAGNOSIS — R92.8 OTHER ABNORMAL AND INCONCLUSIVE FINDINGS ON DIAGNOSTIC IMAGING OF BREAST: Primary | ICD-10-CM

## 2020-03-04 DIAGNOSIS — R92.8 OTHER ABNORMAL AND INCONCLUSIVE FINDINGS ON DIAGNOSTIC IMAGING OF BREAST: ICD-10-CM

## 2020-03-04 DIAGNOSIS — Z92.89 PERSONAL HISTORY OF OTHER MEDICAL TREATMENT: ICD-10-CM

## 2020-03-04 PROCEDURE — 77065 DX MAMMO INCL CAD UNI: CPT | Mod: RT

## 2020-03-04 PROCEDURE — 76642 ULTRASOUND BREAST LIMITED: CPT | Mod: RT

## 2020-03-09 ENCOUNTER — OFFICE VISIT (OUTPATIENT)
Dept: FAMILY MEDICINE | Facility: CLINIC | Age: 60
End: 2020-03-09
Payer: COMMERCIAL

## 2020-03-09 VITALS
WEIGHT: 172 LBS | TEMPERATURE: 98.1 F | BODY MASS INDEX: 31.97 KG/M2 | SYSTOLIC BLOOD PRESSURE: 110 MMHG | OXYGEN SATURATION: 99 % | HEART RATE: 66 BPM | DIASTOLIC BLOOD PRESSURE: 62 MMHG

## 2020-03-09 DIAGNOSIS — L98.9 SKIN LESION OF RIGHT LEG: Primary | ICD-10-CM

## 2020-03-09 PROBLEM — K22.70 BARRETT'S ESOPHAGUS DETERMINED BY BIOPSY: Status: ACTIVE | Noted: 2020-03-09

## 2020-03-09 PROBLEM — K21.00 GASTROESOPHAGEAL REFLUX DISEASE WITH ESOPHAGITIS: Status: ACTIVE | Noted: 2020-03-09

## 2020-03-09 PROCEDURE — 99214 OFFICE O/P EST MOD 30 MIN: CPT | Performed by: FAMILY MEDICINE

## 2020-03-09 RX ORDER — CLOTRIMAZOLE AND BETAMETHASONE DIPROPIONATE 10; .64 MG/G; MG/G
CREAM TOPICAL 2 TIMES DAILY
Qty: 45 G | Refills: 0 | Status: SHIPPED | OUTPATIENT
Start: 2020-03-09 | End: 2020-06-30 | Stop reason: ALTCHOICE

## 2020-03-09 RX ORDER — DEXLANSOPRAZOLE 30 MG/1
CAPSULE, DELAYED RELEASE ORAL
COMMUNITY
Start: 2020-03-03 | End: 2020-03-09

## 2020-03-09 RX ORDER — FAMOTIDINE 20 MG/1
20 TABLET, FILM COATED ORAL 2 TIMES DAILY
COMMUNITY

## 2020-03-09 ASSESSMENT — ENCOUNTER SYMPTOMS
ABDOMINAL PAIN: 0
FATIGUE: 1
SHORTNESS OF BREATH: 0

## 2020-03-09 NOTE — PROGRESS NOTES
[facility] at [facility]     Reason for visit:   Chief Complaint   Patient presents with   • spot on back of leg     right thigh      HPI  She noticed a spot on the back of her right leg a couple of weeks ago. It is somewhat itchy.  She thinks that it  is about the size of a dime and has not changed. No modifying factors she can identify.     Hilda Webster is a 59 y.o. female      The following have been reviewed and updated as appropriate in this visit  Patient Active Problem List    Diagnosis Date Noted   • Gastroesophageal reflux disease with esophagitis 2020   • Jonas's esophagus determined by biopsy 2020   • Hypothyroidism, postsurgical 2020   • Migraine headache 2019   • Episcleritis 2014   • Anemia 2011   • Rosacea 2011   • History of thyroid cancer 2011   • Vitamin D deficiency 2011     History reviewed. No pertinent past medical history.  Past Surgical History:   Procedure Laterality Date   •  SECTION     • ENDOMETRIAL ABLATION     • THYROIDECTOMY       Social History     Socioeconomic History   • Marital status:      Spouse name: Not on file   • Number of children: Not on file   • Years of education: Not on file   • Highest education level: Not on file   Occupational History   • Not on file   Social Needs   • Financial resource strain: Not on file   • Food insecurity:     Worry: Not on file     Inability: Not on file   • Transportation needs:     Medical: Not on file     Non-medical: Not on file   Tobacco Use   • Smoking status: Former Smoker   • Smokeless tobacco: Never Used   Substance and Sexual Activity   • Alcohol use: Yes     Comment: occasionally   • Drug use: Not on file   • Sexual activity: Not on file   Lifestyle   • Physical activity:     Days per week: Not on file     Minutes per session: Not on file   • Stress: Not on file   Relationships   • Social connections:     Talks on phone: Not on file     Gets  together: Not on file     Attends Sabianist service: Not on file     Active member of club or organization: Not on file     Attends meetings of clubs or organizations: Not on file     Relationship status: Not on file   • Intimate partner violence:     Fear of current or ex partner: Not on file     Emotionally abused: Not on file     Physically abused: Not on file     Forced sexual activity: Not on file   Other Topics Concern   • Not on file   Social History Narrative   • Not on file       Family History   Problem Relation Age of Onset   • Cervical cancer Biological Mother    • Diabetes Biological Father    • No Known Problems Biological Sister    • Uterine cancer Paternal Grandmother    • Lung cancer Paternal Grandfather    • Migraines Biological Sister        Allergies   Allergen Reactions   • Excedrin Extra Strength [Aspirin-Acetaminophen-Caffeine] Hives     Hives, Facial Swelling    • Ibuprofen Hives     Hives, Facial Swelling    • Naproxen Hives     Hives, Facial Swelling        Current Outpatient Medications   Medication Sig Dispense Refill   • cannabidiol (CBD) oil 1 each as needed.       • cholecalciferol, vitamin D3, (VITAMIN D3) 5,000 unit tablet take 1 tablet by oral route  every week     • ergocalciferol (VITAMIN D2) 50,000 unit capsule      • famotidine (PEPCID) 20 mg tablet Take 20 mg by mouth 2 (two) times a day.     • levothyroxine (SYNTHROID) 75 mcg tablet Take 75 mcg by mouth every other day.       • SYNTHROID 50 mcg tablet Take 50 mcg by mouth every other day.       • clotrimazole-betamethasone (LOTRISONE) 1-0.05 % cream Apply topically 2 (two) times a day. For 14 days 45 g 0     No current facility-administered medications for this visit.      Review of Systems   Constitutional: Positive for fatigue (mild. recently stopped caffeine and  stopped hormone replacement  ).   Respiratory: Negative for shortness of breath.    Cardiovascular: Negative for chest pain.   Gastrointestinal: Negative for  abdominal pain.   Genitourinary: Negative for decreased urine volume.     Objective   Vitals:    03/09/20 1025   BP: 110/62   Pulse: 66   Temp: 36.7 °C (98.1 °F)   SpO2: 99%   Weight: 78 kg (172 lb)     Body mass index is 31.97 kg/m².    Physical Exam   Constitutional: She appears well-developed and well-nourished. No distress.   HENT:   Head: Normocephalic and atraumatic.   Right Ear: Tympanic membrane and ear canal normal.   Left Ear: Tympanic membrane and ear canal normal.   Mouth/Throat: Oropharynx is clear and moist.   External nose normal   Eyes: Pupils are equal, round, and reactive to light. Conjunctivae and lids are normal.   Neck: No thyromegaly present.   Cardiovascular: Normal rate, regular rhythm and normal heart sounds. Exam reveals no gallop and no friction rub.   No murmur heard.  Pulmonary/Chest: Effort normal and breath sounds normal. She exhibits no deformity.   Abdominal: Soft. There is no tenderness.   Musculoskeletal: She exhibits no edema.   Lymphadenopathy:     She has no cervical adenopathy.   Neurological: She is alert.   Skin: No cyanosis. Nails show no clubbing.   Right mid-posterior thigh with approximately 1-1.5 cm in diameter round lesion with central hyperkeratotic scaling with erythematous ring around the edges   Psychiatric: She has a normal mood and affect.     Procedures       POINT OF CARE TESTING:    No results found for this visit on 03/09/20.     Assessment   Diagnoses and all orders for this visit:    Skin lesion of right leg (Primary)  Comments:  Favor fungal. Lotrisone cream topically bid for 14 days. To dermatology in 2-3 weeks if not resolved     Other orders  -     clotrimazole-betamethasone (LOTRISONE) 1-0.05 % cream; Apply topically 2 (two) times a day. For 14 days         Educated patient on any new medications/doses that I prescribed today and patient expressed understanding and patient expressed understanding of and agreement with plan  No follow-ups on file.      Lu Genao MD  3/9/2020

## 2020-03-17 ENCOUNTER — TELEPHONE (OUTPATIENT)
Dept: FAMILY MEDICINE | Facility: CLINIC | Age: 60
End: 2020-03-17

## 2020-03-17 NOTE — TELEPHONE ENCOUNTER
----- Message from Lu Genao MD sent at 3/16/2020  5:20 PM EDT -----  Please let her know that the radiologist from [facility] issued an addendum stating that the bone lesions are stable compared to her 2013 CT, so no further follow up for them is needed

## 2020-03-17 NOTE — TELEPHONE ENCOUNTER
Pt calling, was seen in [facility] 2/8 because she had esophagitis. They did some testing while she was there, one was an ECG and it showed non specific T wave abnormality now evident in anterior leads, when they compared it to 3/2018 ECG. She was never told about it when she was there or the doctor never commented on it. Want to speak to Dr Genao regarding that, does not know what that means and is this something to be concerned about?

## 2020-03-17 NOTE — TELEPHONE ENCOUNTER
Non-specific T-wave abnormalities are not something we usually worry about, so I suspect that is why they didn't mention it.

## 2020-06-30 ENCOUNTER — OFFICE VISIT (OUTPATIENT)
Dept: FAMILY MEDICINE | Facility: CLINIC | Age: 60
End: 2020-06-30
Payer: COMMERCIAL

## 2020-06-30 VITALS
BODY MASS INDEX: 31.38 KG/M2 | DIASTOLIC BLOOD PRESSURE: 70 MMHG | HEART RATE: 76 BPM | WEIGHT: 170.5 LBS | RESPIRATION RATE: 14 BRPM | SYSTOLIC BLOOD PRESSURE: 110 MMHG | TEMPERATURE: 98.1 F | OXYGEN SATURATION: 98 % | HEIGHT: 62 IN

## 2020-06-30 DIAGNOSIS — E55.9 VITAMIN D DEFICIENCY: ICD-10-CM

## 2020-06-30 DIAGNOSIS — Z00.00 ROUTINE MEDICAL EXAM: Primary | ICD-10-CM

## 2020-06-30 DIAGNOSIS — L40.50 PSORIATIC ARTHRITIS (CMS/HCC): ICD-10-CM

## 2020-06-30 PROBLEM — K44.9 GASTROESOPHAGEAL REFLUX DISEASE WITH HIATAL HERNIA: Status: ACTIVE | Noted: 2020-03-09

## 2020-06-30 PROBLEM — K21.9 GASTROESOPHAGEAL REFLUX DISEASE WITH HIATAL HERNIA: Status: ACTIVE | Noted: 2020-03-09

## 2020-06-30 PROCEDURE — 93000 ELECTROCARDIOGRAM COMPLETE: CPT | Performed by: FAMILY MEDICINE

## 2020-06-30 PROCEDURE — 99396 PREV VISIT EST AGE 40-64: CPT | Performed by: FAMILY MEDICINE

## 2020-06-30 RX ORDER — NEOMYCIN SULFATE, POLYMYXIN B SULFATE, AND DEXAMETHASONE 3.5; 10000; 1 MG/G; [USP'U]/G; MG/G
OINTMENT OPHTHALMIC
COMMUNITY
Start: 2020-06-25 | End: 2020-08-13

## 2020-06-30 RX ORDER — BROMFENAC 0.76 MG/ML
SOLUTION/ DROPS OPHTHALMIC
COMMUNITY
Start: 2020-06-30 | End: 2020-08-13

## 2020-06-30 ASSESSMENT — ENCOUNTER SYMPTOMS
NUMBNESS: 0
ROS GI COMMENTS: NO CHANGE IN BOWEL HABITS
FEVER: 0
ROS SKIN COMMENTS: NO CHANGES IN MOLES
SHORTNESS OF BREATH: 0
SORE THROAT: 0
BRUISES/BLEEDS EASILY: 0
ABDOMINAL PAIN: 0
HEADACHES: 0
MYALGIAS: 0
DYSPHORIC MOOD: 0

## 2020-06-30 NOTE — PROGRESS NOTES
[facility] at [facility]     Reason for visit:   Chief Complaint   Patient presents with   • Annual Exam      HPI  Feeling well overall. Eats a balanced diet. Does exercise: walks, gardens.  Sees gyn for routine exams (was Dr Lanza who just retired.) last exam about 2 years ago.  Mammogram: 3/4/2020  Sees eye doctor at least every year. Gets regular glaucoma screening.    She had a colonoscopy less than 5 years ago at [facility].    She has been primarily staying home and social distancing.    She has been gardening has developed pain right lateral elbow that radiates down forearm as well. She has been using an arm band just distal to her elbow which is helping.    She thinks she had a Tdap within the past 5 years.    Hilda Webster is a 60 y.o. female      The following have been reviewed and updated as appropriate in this visit  Patient Active Problem List    Diagnosis Date Noted   • Psoriatic arthritis (CMS/[facility]) 2020   • Gastroesophageal reflux disease with hiatal hernia 2020   • Jonas's esophagus determined by biopsy 2020   • Hypothyroidism, postsurgical 2020   • Migraine headache 2019   • Episcleritis 2014   • Anemia 2011   • Rosacea 2011   • History of thyroid cancer 2011   • Vitamin D deficiency 2011     History reviewed. No pertinent past medical history.  Past Surgical History:   Procedure Laterality Date   •  SECTION     • ENDOMETRIAL ABLATION     • EYE SURGERY Right    • THYROIDECTOMY       Social History     Socioeconomic History   • Marital status:      Spouse name: Not on file   • Number of children: Not on file   • Years of education: Not on file   • Highest education level: Not on file   Occupational History   • Not on file   Social Needs   • Financial resource strain: Not on file   • Food insecurity:     Worry: Not on file     Inability: Not on file   • Transportation needs:     Medical: Not on file      Non-medical: Not on file   Tobacco Use   • Smoking status: Former Smoker   • Smokeless tobacco: Never Used   Substance and Sexual Activity   • Alcohol use: Yes     Comment: occasionally   • Drug use: Not on file   • Sexual activity: Not on file   Lifestyle   • Physical activity:     Days per week: Not on file     Minutes per session: Not on file   • Stress: Not on file   Relationships   • Social connections:     Talks on phone: Not on file     Gets together: Not on file     Attends Mosque service: Not on file     Active member of club or organization: Not on file     Attends meetings of clubs or organizations: Not on file     Relationship status: Not on file   • Intimate partner violence:     Fear of current or ex partner: Not on file     Emotionally abused: Not on file     Physically abused: Not on file     Forced sexual activity: Not on file   Other Topics Concern   • Not on file   Social History Narrative   • Not on file       Family History   Problem Relation Age of Onset   • Cervical cancer Biological Mother    • Diabetes Biological Father    • No Known Problems Biological Sister    • Uterine cancer Paternal Grandmother    • Lung cancer Paternal Grandfather    • Migraines Biological Sister        Allergies   Allergen Reactions   • Excedrin Extra Strength [Aspirin-Acetaminophen-Caffeine] Hives     Hives, Facial Swelling    • Ibuprofen Hives     Hives, Facial Swelling    • Naproxen Hives     Hives, Facial Swelling        Current Outpatient Medications   Medication Sig Dispense Refill   • bromfenac (BROMSITE) 0.075 % drops      • ergocalciferol (VITAMIN D2) 50,000 unit capsule      • famotidine (PEPCID) 20 mg tablet Take 20 mg by mouth 2 (two) times a day.     • levothyroxine (SYNTHROID) 75 mcg tablet Take 75 mcg by mouth every other day.       • mimi-poly-dex (MAXITROL) 3.5 mg/g-10,000 unit/g-0.1 % ointment 1 APPLICATION IN RIGHT EYE NIGHTLY     • SYNTHROID 50 mcg tablet Take 50 mcg by mouth every other  "day.       • cannabidiol (CBD) oil 1 each as needed.       • cannabidiol (CBD) oil CBD oil for migraines and sleep     • cholecalciferol, vitamin D3, (VITAMIN D3) 5,000 unit tablet take 1 tablet by oral route  every week       No current facility-administered medications for this visit.      Review of Systems   Constitutional: Negative for fever.   HENT: Negative for ear pain and sore throat.    Eyes: Negative for visual disturbance.   Respiratory: Negative for shortness of breath.    Cardiovascular: Negative for chest pain.   Gastrointestinal: Negative for abdominal pain.        No change in bowel habits   Genitourinary: Negative for enuresis.   Musculoskeletal: Negative for myalgias.   Skin: Negative for rash.        No changes in moles   Neurological: Negative for numbness and headaches.        No focal weakness   Hematological: Does not bruise/bleed easily.   Psychiatric/Behavioral: Negative for dysphoric mood.        PHQ2 score=0     Objective   Vitals:    06/30/20 1600   BP: 110/70   Pulse: 76   Resp: 14   Temp: 36.7 °C (98.1 °F)   SpO2: 98%   Weight: 77.3 kg (170 lb 8 oz)   Height: 1.575 m (5' 2\")     Body mass index is 31.18 kg/m².     Visual Acuity Screening    Right eye Left eye Both eyes   Without correction: 20/25 20/20 20/20   With correction:      Comments: - color blind    Physical Exam   Constitutional: She appears well-developed and well-nourished. No distress.   HENT:   Head: Normocephalic and atraumatic.   Right Ear: Tympanic membrane and ear canal normal.   Left Ear: Tympanic membrane and ear canal normal.   Mouth/Throat: Oropharynx is clear and moist.   External nose Normal   Eyes: Pupils are equal, round, and reactive to light. Conjunctivae, EOM and lids are normal.   Fundi WNL   Neck: No thyromegaly present.   Cardiovascular: Normal rate and regular rhythm. Exam reveals no gallop and no friction rub.   No murmur heard.  Carotids 2+ bilaterally. No bruits   Pulmonary/Chest: Effort normal and " breath sounds normal. She exhibits no deformity.   She declined breast exam   Abdominal: Soft. Bowel sounds are normal. She exhibits no distension and no mass. There is no hepatosplenomegaly. There is no tenderness. There is no CVA tenderness.   Musculoskeletal: She exhibits no edema or deformity.   Lymphadenopathy:     She has no cervical adenopathy.   Neurological: She is alert. She displays no Babinski's sign on the right side. She displays no Babinski's sign on the left side.   Reflex Scores:       Bicep reflexes are 2+ on the right side and 2+ on the left side.       Brachioradialis reflexes are 2+ on the right side and 2+ on the left side.       Patellar reflexes are 2+ on the right side and 2+ on the left side.       Achilles reflexes are 2+ on the right side and 2+ on the left side.  Cranial Nerves II-XII grossly intact  Motor grossly normal   Skin: No rash noted. No cyanosis. Nails show no clubbing.   No suspicious lesions   Psychiatric: She has a normal mood and affect.     Procedures       POINT OF CARE TESTING:    No results found for this visit on 06/30/20.     Assessment   Diagnoses and all orders for this visit:    Routine medical exam (Primary)  Comments:  Well pt. EKG done in office. Check labs as below  Orders:  -     ECG 12 LEAD OFFICE PERFORMED  -     CBC and Differential; Future  -     Comprehensive metabolic panel; Future  -     Lipid panel; Future    Psoriatic arthritis (CMS/HCC)  Assessment & Plan:  Follows with Dr Hmuphrey      Vitamin D deficiency  Assessment & Plan:  Check level    Orders:  -     Vitamin D 25 hydroxy; Future       Educated patient on any new medications/doses that I prescribed today and patient expressed understanding and patient expressed understanding of and agreement with plan  No follow-ups on file.     Lu Genao MD  6/30/2020

## 2020-07-22 ENCOUNTER — TELEPHONE (OUTPATIENT)
Dept: FAMILY MEDICINE | Facility: CLINIC | Age: 60
End: 2020-07-22

## 2020-07-22 ENCOUNTER — TELEPHONE (OUTPATIENT)
Dept: SCHEDULING | Facility: CLINIC | Age: 60
End: 2020-07-22

## 2020-07-22 LAB
25(OH)D3+25(OH)D2 SERPL-MCNC: 44.2 NG/ML (ref 30–100)
ALBUMIN SERPL-MCNC: 4.3 G/DL (ref 3.8–4.9)
ALBUMIN/GLOB SERPL: 1.9 {RATIO} (ref 1.2–2.2)
ALP SERPL-CCNC: 97 IU/L (ref 39–117)
ALT SERPL-CCNC: 15 IU/L (ref 0–32)
AST SERPL-CCNC: 19 IU/L (ref 0–40)
BASOPHILS # BLD AUTO: 0 X10E3/UL (ref 0–0.2)
BASOPHILS NFR BLD AUTO: 1 %
BILIRUB SERPL-MCNC: 0.4 MG/DL (ref 0–1.2)
BUN SERPL-MCNC: 10 MG/DL (ref 6–24)
BUN/CREAT SERPL: 11 (ref 9–23)
CALCIUM SERPL-MCNC: 9.5 MG/DL (ref 8.7–10.2)
CHLORIDE SERPL-SCNC: 101 MMOL/L (ref 96–106)
CHOLEST SERPL-MCNC: 239 MG/DL (ref 100–199)
CO2 SERPL-SCNC: 25 MMOL/L (ref 20–29)
CREAT SERPL-MCNC: 0.89 MG/DL (ref 0.57–1)
EOSINOPHIL # BLD AUTO: 0.2 X10E3/UL (ref 0–0.4)
EOSINOPHIL NFR BLD AUTO: 5 %
ERYTHROCYTE [DISTWIDTH] IN BLOOD BY AUTOMATED COUNT: 12.7 % (ref 11.7–15.4)
GLOBULIN SER CALC-MCNC: 2.3 G/DL (ref 1.5–4.5)
GLUCOSE SERPL-MCNC: 92 MG/DL (ref 65–99)
HCT VFR BLD AUTO: 41.9 % (ref 34–46.6)
HDLC SERPL-MCNC: 80 MG/DL
HGB BLD-MCNC: 14.1 G/DL (ref 11.1–15.9)
IMM GRANULOCYTES # BLD AUTO: 0 X10E3/UL (ref 0–0.1)
IMM GRANULOCYTES NFR BLD AUTO: 0 %
LAB CORP EGFR IF AFRICN AM: 83 ML/MIN/1.73
LAB CORP EGFR IF NONAFRICN AM: 72 ML/MIN/1.73
LDLC SERPL CALC-MCNC: 139 MG/DL (ref 0–99)
LYMPHOCYTES # BLD AUTO: 1.7 X10E3/UL (ref 0.7–3.1)
LYMPHOCYTES NFR BLD AUTO: 37 %
MCH RBC QN AUTO: 29 PG (ref 26.6–33)
MCHC RBC AUTO-ENTMCNC: 33.7 G/DL (ref 31.5–35.7)
MCV RBC AUTO: 86 FL (ref 79–97)
MONOCYTES # BLD AUTO: 0.3 X10E3/UL (ref 0.1–0.9)
MONOCYTES NFR BLD AUTO: 6 %
NEUTROPHILS # BLD AUTO: 2.3 X10E3/UL (ref 1.4–7)
NEUTROPHILS NFR BLD AUTO: 51 %
PLATELET # BLD AUTO: 231 X10E3/UL (ref 150–450)
POTASSIUM SERPL-SCNC: 4.5 MMOL/L (ref 3.5–5.2)
PROT SERPL-MCNC: 6.6 G/DL (ref 6–8.5)
RBC # BLD AUTO: 4.87 X10E6/UL (ref 3.77–5.28)
SODIUM SERPL-SCNC: 140 MMOL/L (ref 134–144)
TRIGL SERPL-MCNC: 100 MG/DL (ref 0–149)
VLDLC SERPL CALC-MCNC: 20 MG/DL (ref 5–40)
WBC # BLD AUTO: 4.5 X10E3/UL (ref 3.4–10.8)

## 2020-07-22 NOTE — TELEPHONE ENCOUNTER
Patient requesting routine cardiac eval referred by self, requesting [place] location, pt had ABN EKG recently on 6/20 at PCP office Dr. Genao.    No recent cardio  No recent hosp visits  No avail apts  Pt can be reached at [phone]

## 2020-07-22 NOTE — TELEPHONE ENCOUNTER
----- Message from Lu Genao MD sent at 7/22/2020  8:46 AM EDT -----  Labs look good except LDL cholesterol is higher than desirable, but no high enough to need a statin at this time. Eat a low fat diet and will recheck labs next year when she has her physical

## 2020-08-13 ENCOUNTER — OFFICE VISIT (OUTPATIENT)
Dept: CARDIOLOGY | Facility: CLINIC | Age: 60
End: 2020-08-13
Payer: COMMERCIAL

## 2020-08-13 VITALS
OXYGEN SATURATION: 98 % | RESPIRATION RATE: 20 BRPM | WEIGHT: 170 LBS | DIASTOLIC BLOOD PRESSURE: 60 MMHG | BODY MASS INDEX: 30.12 KG/M2 | HEART RATE: 60 BPM | SYSTOLIC BLOOD PRESSURE: 124 MMHG | HEIGHT: 63 IN

## 2020-08-13 DIAGNOSIS — K21.9 GASTROESOPHAGEAL REFLUX DISEASE WITH HIATAL HERNIA: ICD-10-CM

## 2020-08-13 DIAGNOSIS — R94.31 SHORTENED PR INTERVAL: ICD-10-CM

## 2020-08-13 DIAGNOSIS — E66.811 CLASS 1 OBESITY DUE TO EXCESS CALORIES WITHOUT SERIOUS COMORBIDITY WITH BODY MASS INDEX (BMI) OF 30.0 TO 30.9 IN ADULT: ICD-10-CM

## 2020-08-13 DIAGNOSIS — I05.9 MITRAL VALVE DISORDER: ICD-10-CM

## 2020-08-13 DIAGNOSIS — L40.50 PSORIATIC ARTHRITIS (CMS/HCC): ICD-10-CM

## 2020-08-13 DIAGNOSIS — E66.09 CLASS 1 OBESITY DUE TO EXCESS CALORIES WITHOUT SERIOUS COMORBIDITY WITH BODY MASS INDEX (BMI) OF 30.0 TO 30.9 IN ADULT: ICD-10-CM

## 2020-08-13 DIAGNOSIS — K44.9 GASTROESOPHAGEAL REFLUX DISEASE WITH HIATAL HERNIA: ICD-10-CM

## 2020-08-13 DIAGNOSIS — E78.00 PURE HYPERCHOLESTEROLEMIA: Primary | ICD-10-CM

## 2020-08-13 PROBLEM — E78.49 OTHER HYPERLIPIDEMIA: Status: ACTIVE | Noted: 2020-08-13

## 2020-08-13 PROCEDURE — 99244 OFF/OP CNSLTJ NEW/EST MOD 40: CPT | Performed by: INTERNAL MEDICINE

## 2020-08-13 ASSESSMENT — ENCOUNTER SYMPTOMS
FOCAL WEAKNESS: 0
DYSURIA: 0
POLYDIPSIA: 0
TREMORS: 0
HEMATOCHEZIA: 0
SPUTUM PRODUCTION: 0
FEVER: 0
ABDOMINAL PAIN: 0
FALLS: 0
WHEEZING: 0
HOARSE VOICE: 0
DIAPHORESIS: 0
ODYNOPHAGIA: 0
ALTERED MENTAL STATUS: 0
HEMOPTYSIS: 0
BRIEF PARALYSIS: 0
BLURRED VISION: 0
HEMATEMESIS: 0
HALLUCINATIONS: 0
MYALGIAS: 0

## 2020-08-13 NOTE — ASSESSMENT & PLAN NOTE
Reviewed that this is a risk factor for atherosclerotic disease.  Reviewed Mediterranean type diet which she is changed her diet and lost weight lately, about 8 pounds since February.    Discussed options for treating , empiric treatment versus risk score versus a coronary calcium score.  She will proceed with a coronary calcium score we can reevaluate her wrist.

## 2020-08-13 NOTE — ASSESSMENT & PLAN NOTE
As it is been approximately 19 years since her last evaluation of the mitral valve and there is been a discrepancy whether she does have some thickening, I presume may be mild myxomatous thickening versus mitral problems and repeat echo was recommended.  I did discuss the evolution of the diagnosis of mitral valve prolapse etc.

## 2020-08-13 NOTE — ASSESSMENT & PLAN NOTE
There is no evidence of preexcitation on her ECG.  She has no history suggestive of arrhythmias.  No intervention is needed for this.

## 2020-08-13 NOTE — ASSESSMENT & PLAN NOTE
Theoretically any inflammatory type condition may increase atherosclerosis and this could be a aid to help whether or not to start statins.

## 2020-08-13 NOTE — PROGRESS NOTES
Cardiology Consult Outpatient      Hilda Webster is a 60 y.o. female  who presents for cardiac evaluation with a few issues.  There were nonspecific abnormalities on her ECG, primarily short RI interval without preexcitation in the absence of a history of arrhythmias.  She also has an LDL of 139.  When she was in the 20s she was diagnosed with mitral valve prolapse and 19 years ago, during her last pregnancy, she was told that she did not have prolapse but the mitral valve was thickened.    She has no history of congenital heart disease, coronary disease, myocardial infarction, stroke, congestive heart failure, diagnosed arrhythmias, palpitations, tachycardia, syncope or near syncope.  No claudication.  She walks about 2 miles a few times a week without any decrement in her ability, unusual dyspnea, angina.  There is no orthopnea, syncope or near syncope as above, focal neurologic symptoms or claudication.  Overall she feels well.    Past medical history as outlined below.    Social history: No smoking no alcohol.    Family history: Father had COPD and pulmonary hypertension.  Mother is alive elderly with a right bundle branch block.    OB/GYN: No medical complications of pregnancy.  Postmenopausal.    :   Past Surgical History:   Procedure Laterality Date   •  SECTION     • ENDOMETRIAL ABLATION     • EYE SURGERY Right    • THYROIDECTOMY       Allergies:   Excedrin extra strength [aspirin-acetaminophen-caffeine]; Ibuprofen; and Naproxen    Current Outpatient Medications   Medication Sig Dispense Refill   • cannabidiol (CBD) oil 1 each as needed.       • famotidine (PEPCID) 20 mg tablet Take 20 mg by mouth 2 (two) times a day.     • levothyroxine (SYNTHROID) 75 mcg tablet Take 75 mcg by mouth every other day.       • SYNTHROID 50 mcg tablet Take 50 mcg by mouth every other day.         No current facility-administered medications for this visit.           Social History     Socioeconomic History   •  Marital status:      Spouse name: None   • Number of children: None   • Years of education: None   • Highest education level: None   Occupational History   • None   Social Needs   • Financial resource strain: None   • Food insecurity:     Worry: None     Inability: None   • Transportation needs:     Medical: None     Non-medical: None   Tobacco Use   • Smoking status: Former Smoker   • Smokeless tobacco: Never Used   Substance and Sexual Activity   • Alcohol use: Yes     Comment: occasionally   • Drug use: None   • Sexual activity: None   Lifestyle   • Physical activity:     Days per week: None     Minutes per session: None   • Stress: None   Relationships   • Social connections:     Talks on phone: None     Gets together: None     Attends Yazidism service: None     Active member of club or organization: None     Attends meetings of clubs or organizations: None     Relationship status: None   • Intimate partner violence:     Fear of current or ex partner: None     Emotionally abused: None     Physically abused: None     Forced sexual activity: None   Other Topics Concern   • None   Social History Narrative   • None          Family History   Problem Relation Age of Onset   • Cervical cancer Biological Mother    • Diabetes Biological Father    • No Known Problems Biological Sister    • Uterine cancer Paternal Grandmother    • Lung cancer Paternal Grandfather    • Migraines Biological Sister        Review of Systems   Constitution: Negative for diaphoresis and fever.   HENT: Negative for hoarse voice and odynophagia.    Eyes: Negative for blurred vision and visual disturbance.   Respiratory: Negative for hemoptysis, sputum production and wheezing.    Endocrine: Negative for cold intolerance, heat intolerance and polydipsia.   Skin: Negative for rash.   Musculoskeletal: Negative for falls, muscle weakness and myalgias.   Gastrointestinal: Negative for abdominal pain, hematemesis and hematochezia.  "  Genitourinary: Negative for dysuria.   Neurological: Negative for brief paralysis, focal weakness and tremors.   Psychiatric/Behavioral: Negative for altered mental status and hallucinations.       Objective     Visit Vitals  /60   Pulse 60   Resp 20   Ht 1.6 m (5' 3\")   Wt 77.1 kg (170 lb)   SpO2 98%   BMI 30.11 kg/m²     Wt Readings from Last 3 Encounters:   08/13/20 77.1 kg (170 lb)   06/30/20 77.3 kg (170 lb 8 oz)   03/09/20 78 kg (172 lb)       Physical Exam   Constitutional: She is oriented to person, place, and time. She appears well-developed.   HENT:   Head: Atraumatic.   Mouth/Throat: Oropharynx is clear and moist.   Eyes: Conjunctivae are normal. No scleral icterus.   Neck: No JVD present. Carotid bruit is not present. No tracheal deviation present. No thyromegaly present.   Cardiovascular: Normal rate, regular rhythm and normal heart sounds. Exam reveals no gallop and no friction rub.   No murmur heard.  Pulmonary/Chest: Effort normal and breath sounds normal. No respiratory distress. She has no wheezes. She has no rales.   Abdominal: Soft. Bowel sounds are normal. She exhibits no distension.   Musculoskeletal: She exhibits no edema or tenderness.   Neurological: She is alert and oriented to person, place, and time. No cranial nerve deficit.   Skin: Skin is warm and dry. She is not diaphoretic.   Psychiatric: She has a normal mood and affect.   Vitals reviewed.       Labs   Lab Results   Component Value Date    WBC 4.5 07/21/2020    HGB 14.1 07/21/2020    HCT 41.9 07/21/2020     07/21/2020    CHOL 239 (H) 07/21/2020    TRIG 100 07/21/2020    HDL 80 07/21/2020    LDLCALC 139 (H) 07/21/2020    ALT 15 07/21/2020    AST 19 07/21/2020     07/21/2020    K 4.5 07/21/2020     07/21/2020    CREATININE 0.89 07/21/2020    BUN 10 07/21/2020    CO2 25 07/21/2020    TSH 0.18 (L) 05/01/2019    GLUCOSE 92 07/21/2020       Imaging, no pertinent imaging for review.    ECG EKG:  Sinus rhythm with " short WY interval without preexcitation from June 30    Assessment/Plan     Problem List Items Addressed This Visit        Medium    Pure hypercholesterolemia - Primary    Current Assessment & Plan     Reviewed that this is a risk factor for atherosclerotic disease.  Reviewed Mediterranean type diet which she is changed her diet and lost weight lately, about 8 pounds since February.    Discussed options for treating , empiric treatment versus risk score versus a coronary calcium score.  She will proceed with a coronary calcium score we can reevaluate her wrist.         Relevant Orders    Transthoracic echo (TTE) complete    CT HEART CORONARY CALCIUM SCORE    Mitral valve disorder    Overview     In the 20s diagnosed mitral valve prolapse.  About 2001 told that should she did not have prolapse just a thick valve.         Current Assessment & Plan     As it is been approximately 19 years since her last evaluation of the mitral valve and there is been a discrepancy whether she does have some thickening, I presume may be mild myxomatous thickening versus mitral problems and repeat echo was recommended.  I did discuss the evolution of the diagnosis of mitral valve prolapse etc.         Shortened WY interval    Overview     6/2020 ECG WY interval 110 ms.         Current Assessment & Plan     There is no evidence of preexcitation on her ECG.  She has no history suggestive of arrhythmias.  No intervention is needed for this.            Low    Gastroesophageal reflux disease with hiatal hernia    Overview     Dr Ye ([facility])         Current Assessment & Plan     No classic GI symptoms now.         Psoriatic arthritis (CMS/[facility])    Overview     Rheumatologist is Dr Maris Humphrey ([place])         Current Assessment & Plan     Theoretically any inflammatory type condition may increase atherosclerosis and this could be a aid to help whether or not to start statins.         Class 1 obesity due to excess  calories without serious comorbidity with body mass index (BMI) of 30.0 to 30.9 in adult    Current Assessment & Plan     She has modified her diet and lost some weight encouraged to continue healthy lifestyle, low saturated fat low carbohydrate low calorie diet and exercise.              Orders Placed This Encounter   Procedures   • CT HEART CORONARY CALCIUM SCORE     Standing Status:   Future     Standing Expiration Date:   8/13/2021   • Transthoracic echo (TTE) complete     Standing Status:   Future     Standing Expiration Date:   8/13/2022     Order Specific Question:   Is Definity and/or agitated saline (bubbles) indicated for the study?     Answer:   No       This patient note has been dictated using speech recognition software. Inadvertent speech recognition errors should be disregarded. Please do not hesitate to call my office for clarifications.    Joseph Duvall, DO  8/13/2020 6:17 PM

## 2020-08-13 NOTE — ASSESSMENT & PLAN NOTE
She has modified her diet and lost some weight encouraged to continue healthy lifestyle, low saturated fat low carbohydrate low calorie diet and exercise.

## 2020-08-18 ENCOUNTER — HOSPITAL ENCOUNTER (OUTPATIENT)
Dept: RADIOLOGY | Age: 60
Discharge: HOME | End: 2020-08-18
Attending: INTERNAL MEDICINE

## 2020-08-18 DIAGNOSIS — E78.00 PURE HYPERCHOLESTEROLEMIA: ICD-10-CM

## 2020-08-18 PROCEDURE — 75571 CT HRT W/O DYE W/CA TEST: CPT

## 2020-08-19 ENCOUNTER — TELEPHONE (OUTPATIENT)
Dept: CARDIOLOGY | Facility: CLINIC | Age: 60
End: 2020-08-19

## 2020-08-19 NOTE — TELEPHONE ENCOUNTER
LMOM. Coronary Calcium score is 0. Most recent . Discussed dietary changes and exercise to improve overall heart health. Call with any questions.

## 2020-09-10 ENCOUNTER — APPOINTMENT (OUTPATIENT)
Dept: URBAN - METROPOLITAN AREA CLINIC 200 | Age: 60
Setting detail: DERMATOLOGY
End: 2020-09-18

## 2020-09-10 DIAGNOSIS — L57.0 ACTINIC KERATOSIS: ICD-10-CM

## 2020-09-10 DIAGNOSIS — L91.8 OTHER HYPERTROPHIC DISORDERS OF THE SKIN: ICD-10-CM

## 2020-09-10 DIAGNOSIS — L73.8 OTHER SPECIFIED FOLLICULAR DISORDERS: ICD-10-CM

## 2020-09-10 DIAGNOSIS — L57.8 OTHER SKIN CHANGES DUE TO CHRONIC EXPOSURE TO NONIONIZING RADIATION: ICD-10-CM

## 2020-09-10 DIAGNOSIS — L72.0 EPIDERMAL CYST: ICD-10-CM

## 2020-09-10 DIAGNOSIS — L82.1 OTHER SEBORRHEIC KERATOSIS: ICD-10-CM

## 2020-09-10 DIAGNOSIS — L71.8 OTHER ROSACEA: ICD-10-CM

## 2020-09-10 PROCEDURE — OTHER RECOMMENDATIONS: OTHER

## 2020-09-10 PROCEDURE — OTHER SKIN TAG REMOVAL (COSMETIC): OTHER

## 2020-09-10 PROCEDURE — 17000 DESTRUCT PREMALG LESION: CPT

## 2020-09-10 PROCEDURE — OTHER BENIGN DESTRUCTION COSMETIC: OTHER

## 2020-09-10 PROCEDURE — 99213 OFFICE O/P EST LOW 20 MIN: CPT | Mod: 25

## 2020-09-10 PROCEDURE — OTHER LIQUID NITROGEN: OTHER

## 2020-09-10 PROCEDURE — OTHER REASSURANCE: OTHER

## 2020-09-10 PROCEDURE — OTHER COUNSELING: OTHER

## 2020-09-10 ASSESSMENT — LOCATION SIMPLE DESCRIPTION DERM
LOCATION SIMPLE: RIGHT CLAVICULAR SKIN
LOCATION SIMPLE: RIGHT BREAST
LOCATION SIMPLE: CHEST
LOCATION SIMPLE: RIGHT CALF
LOCATION SIMPLE: RIGHT ANTERIOR NECK
LOCATION SIMPLE: LEFT POSTERIOR UPPER ARM
LOCATION SIMPLE: LEFT CHEEK
LOCATION SIMPLE: LEFT LIP
LOCATION SIMPLE: RIGHT EYEBROW
LOCATION SIMPLE: LEFT ANTERIOR NECK
LOCATION SIMPLE: LEFT EYELID
LOCATION SIMPLE: RIGHT POSTERIOR UPPER ARM

## 2020-09-10 ASSESSMENT — LOCATION DETAILED DESCRIPTION DERM
LOCATION DETAILED: RIGHT DISTAL CALF
LOCATION DETAILED: RIGHT INFERIOR LATERAL NECK
LOCATION DETAILED: RIGHT CLAVICULAR SKIN
LOCATION DETAILED: RIGHT LATERAL BREAST 6-7:00 REGION
LOCATION DETAILED: LEFT MEDIAL CANTHUS
LOCATION DETAILED: LEFT CENTRAL MALAR CHEEK
LOCATION DETAILED: LEFT MEDIAL SUPERIOR CHEST
LOCATION DETAILED: RIGHT CLAVICULAR NECK
LOCATION DETAILED: LEFT DISTAL POSTERIOR UPPER ARM
LOCATION DETAILED: LEFT UPPER CUTANEOUS LIP
LOCATION DETAILED: RIGHT INFERIOR ANTERIOR NECK
LOCATION DETAILED: RIGHT DISTAL POSTERIOR UPPER ARM
LOCATION DETAILED: RIGHT PROXIMAL POSTERIOR UPPER ARM
LOCATION DETAILED: LEFT INFERIOR ANTERIOR NECK
LOCATION DETAILED: RIGHT MEDIAL EYEBROW
LOCATION DETAILED: LEFT PROXIMAL POSTERIOR UPPER ARM

## 2020-09-10 ASSESSMENT — LOCATION ZONE DERM
LOCATION ZONE: LEG
LOCATION ZONE: TRUNK
LOCATION ZONE: EYELID
LOCATION ZONE: FACE
LOCATION ZONE: ARM
LOCATION ZONE: TRUNK
LOCATION ZONE: FACE
LOCATION ZONE: LIP
LOCATION ZONE: NECK

## 2020-09-10 NOTE — PROCEDURE: LIQUID NITROGEN
Post-Care Instructions: I reviewed with the patient in detail post-care instructions. Patient is to wear sunprotection, and avoid picking at any of the treated lesions. Pt may apply Vaseline to crusted or scabbing areas.
Render Note In Bullet Format When Appropriate: No
Number Of Freeze-Thaw Cycles: 1 freeze-thaw cycle
Consent: The patient's consent was obtained including but not limited to risks of crusting, scabbing, blistering, scarring, darker or lighter pigmentary change, recurrence, incomplete removal and infection.
Duration Of Freeze Thaw-Cycle (Seconds): 2
Detail Level: Generalized

## 2020-09-10 NOTE — PROCEDURE: SKIN TAG REMOVAL (COSMETIC)
Detail Level: Zone
Price (Use Numbers Only, No Special Characters Or $): 50.00
Consent: Written consent obtained and the risks of skin tag removal was reviewed with the patient including but not limited to bleeding, pigmentary change, infection, pain, and remote possibility of scarring.
Anesthesia Volume In Cc: 0
Removed With: liquid nitrogen

## 2020-09-21 ENCOUNTER — HOSPITAL ENCOUNTER (OUTPATIENT)
Dept: CARDIOLOGY | Facility: CLINIC | Age: 60
Discharge: HOME | End: 2020-09-21
Attending: INTERNAL MEDICINE
Payer: COMMERCIAL

## 2020-09-21 ENCOUNTER — DOCUMENTATION (OUTPATIENT)
Dept: CARDIOLOGY | Facility: CLINIC | Age: 60
End: 2020-09-21

## 2020-09-21 VITALS
WEIGHT: 170 LBS | DIASTOLIC BLOOD PRESSURE: 80 MMHG | BODY MASS INDEX: 30.12 KG/M2 | HEIGHT: 63 IN | SYSTOLIC BLOOD PRESSURE: 130 MMHG

## 2020-09-21 DIAGNOSIS — E78.00 PURE HYPERCHOLESTEROLEMIA: ICD-10-CM

## 2020-09-21 LAB
AORTIC ROOT ANNULUS: 2.9 CM
ASCENDING AORTA: 2.9 CM
BSA FOR ECHO PROCEDURE: 1.85 M2
E WAVE DECELERATION TIME: 236 MS
E/A RATIO: 0.9
E/E' RATIO: 9.8
E/LAT E' RATIO: 8.8
EDV (BP): 61.4 CM3
EF (A4C): 69.3 %
EF A2C: 67.5 %
EJECTION FRACTION: 68.2 %
EST RIGHT VENT SYSTOLIC PRESSURE BY TRICUSPID REGURGITATION JET: 25 MMHG
ESV (BP): 19.5 CM3
FRACTIONAL SHORTENING: 34.5 %
INTERVENTRICULAR SEPTUM: 0.88 CM
LA ESV (BP): 42.8 CM3
LA ESV INDEX (A2C): 23.78 CM3/M2
LA ESV INDEX (BP): 23.14 CM3/M2
LA/AORTA RATIO: 1.1
LAAS-AP2: 16.2 CM2
LAAS-AP4: 15.9 CM2
LAD 2D: 3.2 CM
LALD A4C: 4.79 CM
LALD A4C: 4.81 CM
LAV-S: 44 CM3
LEFT ATRIUM VOLUME INDEX: 22.49 CM3/M2
LEFT ATRIUM VOLUME: 41.6 CM3
LEFT INTERNAL DIMENSION IN SYSTOLE: 2.89 CM (ref 2.51–3.8)
LEFT VENTRICLE DIASTOLIC VOLUME INDEX: 30.54 CM3/M2
LEFT VENTRICLE DIASTOLIC VOLUME: 56.5 CM3
LEFT VENTRICLE SYSTOLIC VOLUME INDEX: 9.41 CM3/M2
LEFT VENTRICLE SYSTOLIC VOLUME: 17.4 CM3
LEFT VENTRICULAR INTERNAL DIMENSION IN DIASTOLE: 4.41 CM (ref 4.24–5.88)
LEFT VENTRICULAR POSTERIOR WALL IN END DIASTOLE: 0.85 CM (ref 0.56–1.03)
LV DIASTOLIC VOLUME: 66.1 CM3
LV ESV (APICAL 2 CHAMBER): 21.5 CM3
LVAD-AP2: 22.8 CM2
LVAD-AP4: 21.1 CM2
LVAS-AP2: 11.7 CM2
LVAS-AP4: 10.5 CM2
LVEDVI(A2C): 35.73 CM3/M2
LVEDVI(BP): 33.19 CM3/M2
LVESVI(A2C): 11.62 CM3/M2
LVESVI(BP): 10.54 CM3/M2
LVLD-AP2: 6.58 CM
LVLD-AP4: 6.47 CM
LVLS-AP2: 5.36 CM
LVLS-AP4: 5.28 CM
LVOT 2D: 1.6 CM
LVOT A: 2.01 CM2
MV E'TISSUE VEL-LAT: 0.09 M/S
MV E'TISSUE VEL-MED: 0.08 M/S
MV PEAK A VEL: 0.83 M/S
MV PEAK E VEL: 0.77 M/S
POSTERIOR WALL: 0.85 CM
RAP: 5 MMHG
RVOT VMAX: 0.67 M/S
SEPTAL TISSUE DOPPLER FREE WALL LATE DIA VELOCITY (APICAL 4 CHAMBER VIEW): 0.12 M/S
TR MAX PG: 20 MMHG
TRICUSPID VALVE PEAK REGURGITATION VELOCITY: 2.21 M/S
Z-SCORE OF LEFT VENTRICULAR DIMENSION IN END DIASTOLE: -1.24
Z-SCORE OF LEFT VENTRICULAR DIMENSION IN END SYSTOLE: -0.51
Z-SCORE OF LEFT VENTRICULAR POSTERIOR WALL IN END DIASTOLE: 0.6

## 2020-09-21 PROCEDURE — 93306 TTE W/DOPPLER COMPLETE: CPT | Performed by: INTERNAL MEDICINE

## 2020-09-21 NOTE — PROGRESS NOTES
An echocardiogram was performed I discussed the results after the test.  Reviewed his mild mitral regurgitation repeat echo 3-5 years recommended.  I also reviewed a coronary calcium score.  I reviewed that there is significant that is pointed to a very low risk of atherosclerotic events with a calcium score 0 hence the benefit of statins is negated was minimal at best.  Therefore it is not mandatory to take statins.  However I did recommend that she does not take statins, which is her wish, to repeat the coronary calcium score in about 5 years.

## 2022-03-25 ENCOUNTER — APPOINTMENT (OUTPATIENT)
Dept: URBAN - METROPOLITAN AREA CLINIC 200 | Age: 62
Setting detail: DERMATOLOGY
End: 2022-03-29

## 2022-03-25 DIAGNOSIS — Z11.52 ENCOUNTER FOR SCREENING FOR COVID-19: ICD-10-CM

## 2022-03-25 DIAGNOSIS — L57.8 OTHER SKIN CHANGES DUE TO CHRONIC EXPOSURE TO NONIONIZING RADIATION: ICD-10-CM

## 2022-03-25 DIAGNOSIS — L65.9 NONSCARRING HAIR LOSS, UNSPECIFIED: ICD-10-CM

## 2022-03-25 PROCEDURE — OTHER SUNSCREEN RECOMMENDATIONS: OTHER

## 2022-03-25 PROCEDURE — OTHER COUNSELING: OTHER

## 2022-03-25 PROCEDURE — 99213 OFFICE O/P EST LOW 20 MIN: CPT

## 2022-03-25 PROCEDURE — OTHER SCREENING FOR COVID-19: OTHER

## 2022-03-25 ASSESSMENT — LOCATION ZONE DERM
LOCATION ZONE: TRUNK
LOCATION ZONE: SCALP

## 2022-03-25 ASSESSMENT — LOCATION SIMPLE DESCRIPTION DERM
LOCATION SIMPLE: ABDOMEN
LOCATION SIMPLE: POSTERIOR SCALP

## 2022-03-25 ASSESSMENT — LOCATION DETAILED DESCRIPTION DERM
LOCATION DETAILED: PERIUMBILICAL SKIN
LOCATION DETAILED: MID-OCCIPITAL SCALP

## 2022-05-06 ENCOUNTER — OFFICE VISIT (OUTPATIENT)
Dept: FAMILY MEDICINE | Facility: CLINIC | Age: 62
End: 2022-05-06
Payer: COMMERCIAL

## 2022-05-06 ENCOUNTER — TELEPHONE (OUTPATIENT)
Dept: FAMILY MEDICINE | Facility: CLINIC | Age: 62
End: 2022-05-06
Payer: COMMERCIAL

## 2022-05-06 ENCOUNTER — TELEPHONE (OUTPATIENT)
Dept: FAMILY MEDICINE | Facility: CLINIC | Age: 62
End: 2022-05-06

## 2022-05-06 VITALS
SYSTOLIC BLOOD PRESSURE: 124 MMHG | OXYGEN SATURATION: 99 % | HEART RATE: 66 BPM | WEIGHT: 182 LBS | TEMPERATURE: 98.2 F | BODY MASS INDEX: 32.24 KG/M2 | DIASTOLIC BLOOD PRESSURE: 80 MMHG

## 2022-05-06 DIAGNOSIS — M54.50 LUMBAR BACK PAIN: Primary | ICD-10-CM

## 2022-05-06 DIAGNOSIS — L40.50 PSORIATIC ARTHRITIS (CMS/HCC): ICD-10-CM

## 2022-05-06 PROCEDURE — 3008F BODY MASS INDEX DOCD: CPT | Performed by: FAMILY MEDICINE

## 2022-05-06 PROCEDURE — 99213 OFFICE O/P EST LOW 20 MIN: CPT | Performed by: FAMILY MEDICINE

## 2022-05-06 RX ORDER — ERGOCALCIFEROL 1.25 MG/1
CAPSULE ORAL
COMMUNITY
Start: 2022-02-03

## 2022-05-06 RX ORDER — METAXALONE 800 MG/1
800 TABLET ORAL 3 TIMES DAILY PRN
Qty: 30 TABLET | Refills: 0 | Status: SHIPPED | OUTPATIENT
Start: 2022-05-06 | End: 2022-07-11 | Stop reason: ALTCHOICE

## 2022-05-06 RX ORDER — IVERMECTIN 3 MG/1
TABLET ORAL
COMMUNITY
Start: 2022-03-08 | End: 2022-05-06 | Stop reason: ALTCHOICE

## 2022-05-06 RX ORDER — BRIMONIDINE TARTRATE 0.025 %
DROPS OPHTHALMIC (EYE)
COMMUNITY
Start: 2022-03-31

## 2022-05-06 RX ORDER — METAXALONE 800 MG/1
800 TABLET ORAL 3 TIMES DAILY PRN
Qty: 30 TABLET | Refills: 0 | Status: SHIPPED | OUTPATIENT
Start: 2022-05-06 | End: 2022-05-06 | Stop reason: SDUPTHER

## 2022-05-06 RX ORDER — DOXYCYCLINE HYCLATE 50 MG/1
CAPSULE, GELATIN COATED ORAL
COMMUNITY
Start: 2022-03-29 | End: 2022-05-06 | Stop reason: ALTCHOICE

## 2022-05-06 ASSESSMENT — ENCOUNTER SYMPTOMS
NUMBNESS: 0
DYSURIA: 0
ABDOMINAL PAIN: 0
SHORTNESS OF BREATH: 0
FEVER: 0
BACK PAIN: 1
HEMATURIA: 0

## 2022-05-06 NOTE — PROGRESS NOTES
[facility] at [facility]     Reason for visit:   Chief Complaint   Patient presents with   • Back Pain      HPI  She took a shower this morning twisted to get her clothes out of the drawer, put them on the bed and her back spasmed. She used her fascia blaster, used TENS, and iced and it was only somewhat helpful. No radiation to legs    Hilda Webster is a 62 y.o. female      The following have been reviewed and updated as appropriate in this visit  Patient Active Problem List    Diagnosis Date Noted   • Pure hypercholesterolemia 2020   • Mitral valve disorder 2020   • Class 1 obesity due to excess calories without serious comorbidity with body mass index (BMI) of 30.0 to 30.9 in adult 2020   • Shortened [place] interval 2020   • Psoriatic arthritis (CMS/[facility]) 2020   • Gastroesophageal reflux disease with hiatal hernia 2020   • Jonas's esophagus determined by biopsy 2020   • Hypothyroidism, postsurgical 2020   • Migraine headache 2019   • Rosacea 2011   • History of thyroid cancer 2011   • Vitamin D deficiency 2011     History reviewed. No pertinent past medical history.  Past Surgical History:   Procedure Laterality Date   •  SECTION     • ENDOMETRIAL ABLATION     • EYE SURGERY Right    • THYROIDECTOMY       Social History     Socioeconomic History   • Marital status:      Spouse name: Not on file   • Number of children: Not on file   • Years of education: Not on file   • Highest education level: Not on file   Occupational History   • Not on file   Tobacco Use   • Smoking status: Former Smoker   • Smokeless tobacco: Never Used   Substance and Sexual Activity   • Alcohol use: Yes     Comment: occasionally   • Drug use: Not on file   • Sexual activity: Not on file   Other Topics Concern   • Not on file   Social History Narrative   • Not on file     Social Determinants of Health     Financial Resource Strain: Not on  file   Food Insecurity: Not on file   Transportation Needs: Not on file   Physical Activity: Not on file   Stress: Not on file   Social Connections: Not on file   Intimate Partner Violence: Not on file   Housing Stability: Not on file       Family History   Problem Relation Age of Onset   • Cervical cancer Biological Mother    • Diabetes Biological Father    • No Known Problems Biological Sister    • Uterine cancer Paternal Grandmother    • Lung cancer Paternal Grandfather    • Migraines Biological Sister        Allergies   Allergen Reactions   • Excedrin Extra Strength [Aspirin-Acetaminophen-Caffeine] Hives     Hives, Facial Swelling    • Ibuprofen Hives     Hives, Facial Swelling    • Naproxen Hives     Hives, Facial Swelling        Current Outpatient Medications   Medication Sig Dispense Refill   • cannabidiol (CBD) oil 1 each as needed.       • ergocalciferol (VITAMIN D2) 50,000 unit(1250 mcg) capsule 1 (ONE) CAPSULE ONCE WEEK     • famotidine (PEPCID) 20 mg tablet Take 20 mg by mouth 2 (two) times a day.     • levothyroxine (SYNTHROID) 75 mcg tablet Take 75 mcg by mouth every other day.       • LUMIFY 0.025 % drops INSTILL 1 DROP INTO BOTH EYES AS DIRECTED     • metaxalone (SKELAXIN) 800 mg tablet Take 1 tablet (800 mg total) by mouth 3 (three) times a day as needed for muscle spasms. Take 0.5 to 1 tablet by oral route once a day  at hs 30 tablet 0   • SYNTHROID 50 mcg tablet Take 50 mcg by mouth every other day.         No current facility-administered medications for this visit.     Review of Systems   Constitutional: Negative for fever.   Respiratory: Negative for shortness of breath.    Gastrointestinal: Negative for abdominal pain.   Genitourinary: Negative for dysuria and hematuria.   Musculoskeletal: Positive for back pain.   Neurological: Negative for numbness.     Objective   Vitals:    05/06/22 1354   BP: 124/80   BP Location: Right upper arm   Patient Position: Sitting   Pulse: 66   Temp: 36.8 °C  (98.2 °F)   TempSrc: Oral   SpO2: 99%   Weight: 82.6 kg (182 lb)     Body mass index is 32.24 kg/m².    Physical Exam  Constitutional:       General: She is not in acute distress.     Appearance: She is well-developed.   HENT:      Head: Normocephalic and atraumatic.      Right Ear: Tympanic membrane and ear canal normal.      Left Ear: Tympanic membrane and ear canal normal.   Eyes:      General: Lids are normal.      Conjunctiva/sclera: Conjunctivae normal.      Pupils: Pupils are equal, round, and reactive to light.   Neck:      Thyroid: No thyromegaly.   Cardiovascular:      Rate and Rhythm: Normal rate and regular rhythm.      Heart sounds: Normal heart sounds. No murmur heard.    No friction rub. No gallop.   Pulmonary:      Effort: Pulmonary effort is normal.      Breath sounds: Normal breath sounds.   Abdominal:      Palpations: Abdomen is soft.      Tenderness: There is no abdominal tenderness.   Musculoskeletal:      Right lower leg: No edema.      Left lower leg: No edema.      Comments: Positive palpable muscle tightness paraspinal muscles in lumbar region. No tenderness over thoracic or lumbar spine   Lymphadenopathy:      Cervical: No cervical adenopathy.   Skin:     Coloration: Skin is not cyanotic.      Nails: There is no clubbing.   Neurological:      Mental Status: She is alert.       Procedures       POINT OF CARE TESTING:    No results found for this visit on 05/06/22.     Assessment   Diagnoses and all orders for this visit:    Lumbar back pain (Primary)  Comments:  metaxalone as below    Psoriatic arthritis (CMS/HCC)  Assessment & Plan:  Follows with Dr Humphrey      Other orders  -     metaxalone (SKELAXIN) 800 mg tablet; Take 1 tablet (800 mg total) by mouth 3 (three) times a day as needed for muscle spasms. Take 0.5 to 1 tablet by oral route once a day  at        Educated patient on any new medications/doses that I prescribed today and patient expressed understanding and patient expressed  understanding of and agreement with plan  No follow-ups on file.     Lu Genao MD  5/6/2022

## 2022-05-06 NOTE — TELEPHONE ENCOUNTER
She hasn't been seen here in almost 2 years, so I can't just send in a prescription. She needs to be seen by one of us

## 2022-05-06 NOTE — TELEPHONE ENCOUNTER
Pt requesting a muscle relaxer (Metaxalone) stating she can hardly walk.    Appt offered but not scheduled. Pt states this has been prescribed for her in the past    Please advise

## 2022-07-11 ENCOUNTER — OFFICE VISIT (OUTPATIENT)
Dept: FAMILY MEDICINE | Facility: CLINIC | Age: 62
End: 2022-07-11
Payer: COMMERCIAL

## 2022-07-11 VITALS
HEIGHT: 62 IN | HEART RATE: 64 BPM | TEMPERATURE: 98.1 F | BODY MASS INDEX: 33.75 KG/M2 | DIASTOLIC BLOOD PRESSURE: 76 MMHG | SYSTOLIC BLOOD PRESSURE: 140 MMHG | OXYGEN SATURATION: 98 % | WEIGHT: 183.4 LBS

## 2022-07-11 DIAGNOSIS — E55.9 VITAMIN D DEFICIENCY: ICD-10-CM

## 2022-07-11 DIAGNOSIS — Z00.00 ADULT GENERAL MEDICAL EXAMINATION: Primary | ICD-10-CM

## 2022-07-11 LAB
BILIRUBIN, POC: NEGATIVE
BLOOD URINE, POC: NEGATIVE
CLARITY, POC: CLEAR
COLOR, POC: COLORLESS
EXPIRATION DATE: NORMAL
GLUCOSE URINE, POC: NEGATIVE
KETONES, POC: NEGATIVE
LEUKOCYTE EST, POC: NEGATIVE
Lab: NORMAL
NITRITE, POC: NEGATIVE
PH, POC: 5
POCT MANUFACTURER: NORMAL
PROTEIN, POC: NEGATIVE
SPECIFIC GRAVITY, POC: 1
UROBILINOGEN, POC: 0.2

## 2022-07-11 PROCEDURE — 99396 PREV VISIT EST AGE 40-64: CPT | Performed by: FAMILY MEDICINE

## 2022-07-11 PROCEDURE — 81002 URINALYSIS NONAUTO W/O SCOPE: CPT | Performed by: FAMILY MEDICINE

## 2022-07-11 PROCEDURE — 3008F BODY MASS INDEX DOCD: CPT | Performed by: FAMILY MEDICINE

## 2022-07-11 RX ORDER — AMOXICILLIN 500 MG/1
CAPSULE ORAL
COMMUNITY
Start: 2022-06-24 | End: 2022-07-11 | Stop reason: ALTCHOICE

## 2022-07-11 ASSESSMENT — ENCOUNTER SYMPTOMS
ROS GI COMMENTS: NO CHANGE IN BOWEL HABITS
MYALGIAS: 0
HEADACHES: 0
HEMATURIA: 0
BRUISES/BLEEDS EASILY: 0
ABDOMINAL PAIN: 0
SORE THROAT: 0
ROS SKIN COMMENTS: NO CHANGE IN MOLES
FEVER: 0
SHORTNESS OF BREATH: 0
DYSURIA: 0

## 2022-07-11 ASSESSMENT — PATIENT HEALTH QUESTIONNAIRE - PHQ9: SUM OF ALL RESPONSES TO PHQ9 QUESTIONS 1 & 2: 0

## 2022-07-11 NOTE — PROGRESS NOTES
[facility] at [facility]     Reason for visit:   Chief Complaint   Patient presents with   • Annual Exam     P/E-Declined EKG, states saw cardiology last year. Declined eye exam test.      HPI  Eats a balanced diet. Does exercise: piliates. Also using rodeo-core, walks   Sees gyn for routine exams (Marlys Suarez.) last exam early . She is planning to go in the next year.  Mammogram: 2021  Colonoscopy: 2013  Sees eye doctor at least every year. Gets regular glaucoma screening.      She does not want the Shingrix vaccine.  She'll check to see when she last had a Tdap    Hilda Webster is a 62 y.o. female      The following have been reviewed and updated as appropriate in this visit  Patient Active Problem List    Diagnosis Date Noted   • Pure hypercholesterolemia 2020   • Mitral valve disorder 2020   • Class 1 obesity due to excess calories without serious comorbidity with body mass index (BMI) of 30.0 to 30.9 in adult 2020   • Shortened ME interval 2020   • Psoriatic arthritis (CMS/HCC) 2020   • Gastroesophageal reflux disease with hiatal hernia 2020   • Jonas's esophagus determined by biopsy 2020   • Hypothyroidism, postsurgical 2020   • Migraine headache 2019   • Rosacea 2011   • History of thyroid cancer 2011   • Vitamin D deficiency 2011     Past Medical History:   Diagnosis Date   • COVID-19 2022     Past Surgical History:   Procedure Laterality Date   •  SECTION     • ENDOMETRIAL ABLATION     • EYE SURGERY Right    • THYROIDECTOMY       Social History     Socioeconomic History   • Marital status:      Spouse name: Not on file   • Number of children: Not on file   • Years of education: Not on file   • Highest education level: Not on file   Occupational History   • Not on file   Tobacco Use   • Smoking status: Former Smoker   • Smokeless tobacco: Never Used   Substance and Sexual Activity    • Alcohol use: Yes     Comment: rarely/wine   • Drug use: Not on file   • Sexual activity: Not on file   Other Topics Concern   • Not on file   Social History Narrative   • Not on file     Social Determinants of Health     Financial Resource Strain: Not on file   Food Insecurity: Not on file   Transportation Needs: Not on file   Physical Activity: Not on file   Stress: Not on file   Social Connections: Not on file   Intimate Partner Violence: Not on file   Housing Stability: Not on file       Family History   Problem Relation Age of Onset   • Cervical cancer Biological Mother    • Diabetes Biological Father    • No Known Problems Biological Sister    • Uterine cancer Paternal Grandmother    • Lung cancer Paternal Grandfather    • Migraines Biological Sister        Allergies   Allergen Reactions   • Excedrin Extra Strength [Aspirin-Acetaminophen-Caffeine] Hives     Hives, Facial Swelling    • Ibuprofen Hives     Hives, Facial Swelling    • Naproxen Hives     Hives, Facial Swelling        Current Outpatient Medications   Medication Sig Dispense Refill   • cannabidiol (CBD) oil 1 each as needed.       • ergocalciferol (VITAMIN D2) 50,000 unit(1250 mcg) capsule 1 (ONE) CAPSULE ONCE WEEK     • famotidine (PEPCID) 20 mg tablet Take 20 mg by mouth 2 (two) times a day.     • levothyroxine (SYNTHROID) 75 mcg tablet Take 75 mcg by mouth every other day. 75 mcg every day day alternate 50 mcg     • SYNTHROID 50 mcg tablet Take 50 mcg by mouth every other day.       • LUMIFY 0.025 % drops INSTILL 1 DROP INTO BOTH EYES AS DIRECTED       No current facility-administered medications for this visit.     Review of Systems   Constitutional: Negative for fever.   HENT: Negative for ear pain and sore throat.    Eyes: Negative for visual disturbance.   Respiratory: Negative for shortness of breath.    Cardiovascular: Negative for chest pain.   Gastrointestinal: Negative for abdominal pain.        No change in bowel habits  "  Genitourinary: Negative for dysuria and hematuria.   Musculoskeletal: Negative for myalgias.   Skin: Negative for rash.        No change in moles   Neurological: Negative for headaches.   Hematological: Does not bruise/bleed easily.   Psychiatric/Behavioral:        PHQ2 score=0     Objective   Vitals:    07/11/22 1518   BP: 140/76   BP Location: Left upper arm   Patient Position: Sitting   Pulse: 64   Temp: 36.7 °C (98.1 °F)   TempSrc: Oral   SpO2: 98%   Weight: 83.2 kg (183 lb 6.4 oz)   Height: 1.575 m (5' 2\")     Body mass index is 33.54 kg/m².    Physical Exam  Constitutional:       General: She is not in acute distress.     Appearance: She is well-developed.   HENT:      Head: Normocephalic and atraumatic.      Right Ear: Tympanic membrane and ear canal normal.      Left Ear: Tympanic membrane and ear canal normal.   Eyes:      General: Lids are normal.      Extraocular Movements: Extraocular movements intact.      Conjunctiva/sclera: Conjunctivae normal.      Pupils: Pupils are equal, round, and reactive to light.      Comments: Fundi WNL   Neck:      Thyroid: No thyromegaly.   Cardiovascular:      Rate and Rhythm: Normal rate and regular rhythm.      Heart sounds: No murmur heard.    No friction rub. No gallop.      Comments: Carotids 2+ bilaterally. No bruits  Pulmonary:      Effort: Pulmonary effort is normal.      Breath sounds: Normal breath sounds.      Comments: Breasts: no mass or axillary lymphadenopathy bilaterally   Abdominal:      General: Bowel sounds are normal. There is no distension.      Palpations: Abdomen is soft. There is no hepatomegaly, splenomegaly or mass.      Tenderness: There is no abdominal tenderness. There is no right CVA tenderness or left CVA tenderness.   Musculoskeletal:         General: No deformity.      Right lower leg: No edema.      Left lower leg: No edema.   Lymphadenopathy:      Cervical: No cervical adenopathy.   Skin:     Coloration: Skin is not cyanotic.      " Findings: No rash.      Nails: There is no clubbing.      Comments: No suspicious lesions   Neurological:      Mental Status: She is alert.      Deep Tendon Reflexes: Babinski sign absent on the right side. Babinski sign absent on the left side.      Reflex Scores:       Bicep reflexes are 2+ on the right side and 2+ on the left side.       Brachioradialis reflexes are 2+ on the right side and 2+ on the left side.       Patellar reflexes are 2+ on the right side and 2+ on the left side.       Achilles reflexes are 2+ on the right side and 2+ on the left side.     Comments: Cranial Nerves II-XII grossly intact  Motor grossly normal       Procedures       POINT OF CARE TESTING:    Results for orders placed or performed in visit on 07/11/22   POCT urinalysis dipstick   Result Value Ref Range    Color, UA Colorless     Clarity, UA Clear     Glucose, UA Negative     Bilirubin, UA Negative     Ketones, UA Negative     Spec Grav, UA 1.000     Blood, UA Negative     pH, UA 5.0     Protein, UA Negative     Urobilinogen, UA 0.2     Leukocytes, UA Negative     Nitrite, UA Negative     Expiration Date 02/28/2023     Lot Number [id number]     POCT  ROCHE         Assessment   Diagnoses and all orders for this visit:    Adult general medical examination (Primary)  Comments:  well patient. UA done in office. Check labs as below  Orders:  -     POCT urinalysis dipstick  -     CBC and Differential; Future  -     Comprehensive metabolic panel; Future  -     Lipid panel; Future    Vitamin D deficiency  Assessment & Plan:  Check level    Orders:  -     Vitamin D 25 hydroxy; Future       Educated patient on any new medications/doses that I prescribed today and patient expressed understanding and patient expressed understanding of and agreement with plan  No follow-ups on file.     Lu Genao MD  7/11/2022

## 2022-10-29 ENCOUNTER — HOSPITAL ENCOUNTER (OUTPATIENT)
Facility: CLINIC | Age: 62
Discharge: HOME | End: 2022-10-29
Attending: FAMILY MEDICINE
Payer: COMMERCIAL

## 2022-10-29 VITALS
HEIGHT: 62 IN | HEART RATE: 78 BPM | TEMPERATURE: 97.6 F | WEIGHT: 180 LBS | BODY MASS INDEX: 33.13 KG/M2 | RESPIRATION RATE: 16 BRPM | OXYGEN SATURATION: 99 % | DIASTOLIC BLOOD PRESSURE: 78 MMHG | SYSTOLIC BLOOD PRESSURE: 141 MMHG

## 2022-10-29 DIAGNOSIS — M79.10 MUSCLE ACHE: Primary | ICD-10-CM

## 2022-10-29 PROCEDURE — 99214 OFFICE O/P EST MOD 30 MIN: CPT | Performed by: FAMILY MEDICINE

## 2022-10-29 PROCEDURE — S9083 URGENT CARE CENTER GLOBAL: HCPCS | Performed by: FAMILY MEDICINE

## 2022-10-29 ASSESSMENT — ENCOUNTER SYMPTOMS
GASTROINTESTINAL NEGATIVE: 1
NEUROLOGICAL NEGATIVE: 1
RESPIRATORY NEGATIVE: 1
PSYCHIATRIC NEGATIVE: 1
CONSTITUTIONAL NEGATIVE: 1
MYALGIAS: 1
EYES NEGATIVE: 1

## 2022-10-29 NOTE — DISCHARGE INSTRUCTIONS
Fluids  Rest  Avoid any strenuous activity like gym and lifting weights for now  Follow up with your pcp in 1 week if still not better

## 2022-10-30 NOTE — ED PROVIDER NOTES
History  Chief Complaint   Patient presents with    Muscle Pain     She is here with the c/o whole bodyaches and muscle aches ,she was seen recently by [facility]  And given prednisone taper for her neck pain radiating to her left upper extremity,for possible nerve compression  .after she is done with that prednisone taper she started having muscle aches  She has h/o psoriatic arthritis and she doesn;t feel those symptoms now .  No fever  No URI symptoms  No recent tick bite          Past Medical History:   Diagnosis Date    COVID-19 2022       Past Surgical History:   Procedure Laterality Date     SECTION      ENDOMETRIAL ABLATION  2012    EYE SURGERY Right 2020    THYROIDECTOMY         Family History   Problem Relation Age of Onset    Cervical cancer Biological Mother     Diabetes Biological Father     No Known Problems Biological Sister     Uterine cancer Paternal Grandmother     Lung cancer Paternal Grandfather     Migraines Biological Sister        Social History     Tobacco Use    Smoking status: Former    Smokeless tobacco: Never   Substance Use Topics    Alcohol use: Yes     Comment: rarely/wine       Review of Systems   Constitutional: Negative.    HENT: Negative.    Eyes: Negative.    Respiratory: Negative.    Gastrointestinal: Negative.    Genitourinary: Negative.    Musculoskeletal: Positive for myalgias.   Skin: Negative for rash.   Neurological: Negative.    Psychiatric/Behavioral: Negative.        Physical Exam  ED Triage Vitals [10/29/22 1310]   Temp Heart Rate Resp BP SpO2   36.4 °C (97.6 °F) 78 16 (!) 141/78 99 %      Temp Source Heart Rate Source Patient Position BP Location FiO2 (%) (Set)   Oral Monitor Sitting Left upper arm --       Physical Exam  Cardiovascular:      Rate and Rhythm: Normal rate and regular rhythm.      Pulses: Normal pulses.      Heart sounds: Normal heart sounds.   Pulmonary:      Effort: Pulmonary effort is normal.      Breath sounds:  Normal breath sounds. No wheezing or rales.   Musculoskeletal:      Comments: Has diffuse muscles aches ,no joint swelling or tenderness   Skin:     Findings: No rash.           Procedures  Procedures    UC Course       MDM  Number of Diagnoses or Management Options  Muscle ache  Diagnosis management comments: She called [facility] as per them it may not be due steroid  Advised patient to take rest ,fluids and avoid any strenuous activity like lifting weights   And if still has symptoms advised to see pcp or ortho or rheumatology  Offered flu and covid to rule out but she doesn 't feel those symptoms and   Would  Hold off for now   She thinks BP is high from her muscle pain,NO H/O HTN ,advised to see pcp for recheck                  Hiral Ortega MD  10/29/22 2157       Hiral Ortega MD  10/29/22 2153

## 2024-01-08 ENCOUNTER — OFFICE VISIT (OUTPATIENT)
Dept: FAMILY MEDICINE | Facility: CLINIC | Age: 64
End: 2024-01-08
Payer: COMMERCIAL

## 2024-01-08 ENCOUNTER — TELEPHONE (OUTPATIENT)
Dept: FAMILY MEDICINE | Facility: CLINIC | Age: 64
End: 2024-01-08

## 2024-01-08 VITALS
BODY MASS INDEX: 35.15 KG/M2 | DIASTOLIC BLOOD PRESSURE: 78 MMHG | SYSTOLIC BLOOD PRESSURE: 132 MMHG | OXYGEN SATURATION: 97 % | HEART RATE: 79 BPM | WEIGHT: 191 LBS | HEIGHT: 62 IN | RESPIRATION RATE: 14 BRPM

## 2024-01-08 DIAGNOSIS — R30.0 DYSURIA: ICD-10-CM

## 2024-01-08 DIAGNOSIS — L40.50 PSORIATIC ARTHRITIS (CMS/HCC): ICD-10-CM

## 2024-01-08 DIAGNOSIS — N30.90 CYSTITIS: Primary | ICD-10-CM

## 2024-01-08 DIAGNOSIS — K57.92 DIVERTICULITIS: ICD-10-CM

## 2024-01-08 LAB
BILIRUBIN, POC: NEGATIVE
BLOOD URINE, POC: NEGATIVE
CLARITY, POC: CLEAR
COLOR, POC: YELLOW
EXPIRATION DATE: NORMAL
GLUCOSE URINE, POC: NEGATIVE
KETONES, POC: NEGATIVE
LEUKOCYTE EST, POC: NEGATIVE
Lab: NORMAL
NITRITE, POC: NEGATIVE
PH, POC: 6.5
POCT MANUFACTURER: NORMAL
PROTEIN, POC: NEGATIVE
SPECIFIC GRAVITY, POC: 1.01
UROBILINOGEN, POC: 0.2

## 2024-01-08 PROCEDURE — 81002 URINALYSIS NONAUTO W/O SCOPE: CPT | Performed by: FAMILY MEDICINE

## 2024-01-08 PROCEDURE — 99214 OFFICE O/P EST MOD 30 MIN: CPT | Performed by: FAMILY MEDICINE

## 2024-01-08 PROCEDURE — 3008F BODY MASS INDEX DOCD: CPT | Performed by: FAMILY MEDICINE

## 2024-01-08 RX ORDER — NITROFURANTOIN 25; 75 MG/1; MG/1
100 CAPSULE ORAL 2 TIMES DAILY
Qty: 14 CAPSULE | Refills: 0 | Status: SHIPPED | OUTPATIENT
Start: 2024-01-08 | End: 2024-01-15

## 2024-01-08 RX ORDER — FLUCONAZOLE 150 MG/1
TABLET ORAL
Qty: 2 TABLET | Refills: 0 | Status: SHIPPED | OUTPATIENT
Start: 2024-01-08 | End: 2024-04-08 | Stop reason: ALTCHOICE

## 2024-01-08 ASSESSMENT — ENCOUNTER SYMPTOMS
FREQUENCY: 1
ABDOMINAL PAIN: 0
DYSURIA: 1
FLANK PAIN: 0
CHILLS: 0
DIARRHEA: 0
FATIGUE: 0
NAUSEA: 0
FEVER: 0
RECTAL PAIN: 0
HEMATURIA: 0
BLOOD IN STOOL: 0
CONSTIPATION: 0
UNEXPECTED WEIGHT CHANGE: 0
VOMITING: 0
DIFFICULTY URINATING: 0

## 2024-01-08 NOTE — PROGRESS NOTES
[facility] at [facility]     Reason for visit:   Chief Complaint   Patient presents with   • Difficulty Urinating      Hilda Webster is a 63 y.o. female      Urinary Tract Infection  Patient complains of dysuria, frequency, incomplete bladder emptying, pain in the lower abdomen and suprapubic pressure. She has had symptoms for 2 days. Patient also complains of fatigue. Patient denies back pain, fever and vaginal discharge. Patient does not have a history of recurrent UTI. Patient does not have a history of pyelonephritis.     Completed a 10 day course of Augmentin for diverticulitis flair on 2024       Review of Systems   Constitutional: Negative for chills, fatigue, fever and unexpected weight change.   Gastrointestinal: Negative for abdominal pain, blood in stool, constipation, diarrhea, nausea, rectal pain and vomiting.   Genitourinary: Positive for dysuria, frequency, pelvic pain and urgency. Negative for decreased urine volume, difficulty urinating, flank pain, hematuria, vaginal bleeding, vaginal discharge and vaginal pain.   All other systems reviewed and are negative.       Patient Active Problem List   Diagnosis   • Migraine headache   • Rosacea   • History of thyroid cancer   • Vitamin D deficiency   • Hypothyroidism, postsurgical   • Gastroesophageal reflux disease with hiatal hernia   • Jonas's esophagus determined by biopsy   • Psoriatic arthritis (CMS/HCC)   • Pure hypercholesterolemia   • Mitral valve disorder   • Class 1 obesity due to excess calories without serious comorbidity with body mass index (BMI) of 30.0 to 30.9 in adult   • Shortened CO interval         Past Medical History:   Diagnosis Date   • COVID-19 2022     Past Surgical History:   Procedure Laterality Date   •  SECTION     • ENDOMETRIAL ABLATION     • EYE SURGERY Right    • THYROIDECTOMY       Social History     Socioeconomic History   • Marital status:      Spouse name: Not on file   •  Number of children: Not on file   • Years of education: Not on file   • Highest education level: Not on file   Occupational History   • Not on file   Tobacco Use   • Smoking status: Former   • Smokeless tobacco: Never   Substance and Sexual Activity   • Alcohol use: Yes     Comment: rarely/wine   • Drug use: Not on file   • Sexual activity: Not on file   Other Topics Concern   • Not on file   Social History Narrative   • Not on file     Social Determinants of Health     Financial Resource Strain: Not on file   Food Insecurity: Not on file   Transportation Needs: Not on file   Physical Activity: Not on file   Stress: Not on file   Social Connections: Not on file   Intimate Partner Violence: Not on file   Housing Stability: Not on file     Family History   Problem Relation Age of Onset   • Cervical cancer Biological Mother    • Diabetes Biological Father    • No Known Problems Biological Sister    • Uterine cancer Paternal Grandmother    • Lung cancer Paternal Grandfather    • Migraines Biological Sister        Allergies:  Excedrin extra strength [aspirin-acetaminophen-caffeine], Ibuprofen, Naproxen, and Flonase [fluticasone]    Current Outpatient Medications   Medication Sig Dispense Refill   • cannabidiol (CBD) oil 1 each as needed.       • ergocalciferol (VITAMIN D2) 50,000 unit(1250 mcg) capsule 1 (ONE) CAPSULE ONCE WEEK     • famotidine (PEPCID) 20 mg tablet Take 20 mg by mouth 2 (two) times a day.     • fluconazole (DIFLUCAN) 150 mg tablet Take one tablet PO once, can repeat dose in 72 hours if symptoms persist 2 tablet 0   • levothyroxine (SYNTHROID) 75 mcg tablet Take 75 mcg by mouth every other day. 75 mcg every day day alternate 50 mcg     • nitrofurantoin, macrocrystal-monohydrate, (MACROBID) 100 mg capsule Take 1 capsule (100 mg total) by mouth 2 (two) times a day for 7 days. 14 capsule 0   • SYNTHROID 50 mcg tablet Take 50 mcg by mouth every other day.       • LUMIFY 0.025 % drops INSTILL 1 DROP INTO BOTH  "EYES AS DIRECTED       No current facility-administered medications for this visit.        Objective   Vitals:    01/08/24 1016   BP: 132/78   BP Location: Right upper arm   Patient Position: Sitting   Pulse: 79   Resp: 14   SpO2: 97%   Weight: 86.6 kg (191 lb)   Height: 1.575 m (5' 2\")     Ht Readings from Last 3 Encounters:   01/08/24 1.575 m (5' 2\")   10/29/22 1.575 m (5' 2\")   07/11/22 1.575 m (5' 2\")     Wt Readings from Last 3 Encounters:   01/08/24 86.6 kg (191 lb)   10/29/22 81.6 kg (180 lb)   07/11/22 83.2 kg (183 lb 6.4 oz)     Body mass index is 34.93 kg/m².    Physical Exam  Vitals reviewed.   Constitutional:       General: She is not in acute distress.     Appearance: She is well-developed. She is not diaphoretic.   HENT:      Head: Normocephalic and atraumatic.      Right Ear: External ear normal.      Left Ear: External ear normal.   Eyes:      Pupils: Pupils are equal, round, and reactive to light.   Musculoskeletal:         General: Normal range of motion.   Skin:     Capillary Refill: Capillary refill takes less than 2 seconds.   Neurological:      Mental Status: She is alert and oriented to person, place, and time.      Cranial Nerves: No cranial nerve deficit.      Sensory: No sensory deficit.      Motor: No abnormal muscle tone.      Coordination: Coordination normal.   Psychiatric:         Behavior: Behavior normal.         Thought Content: Thought content normal.         Judgment: Judgment normal.         Point of Care Testing Results  Results for orders placed or performed in visit on 01/08/24   POCT urinalysis dipstick   Result Value Ref Range    Color, UA Yellow     Clarity, UA Clear     Glucose, UA Negative     Bilirubin, UA Negative     Ketones, UA Negative     Spec Grav, UA 1.010     Blood, UA Negative     pH, UA 6.5     Protein, UA Negative     Urobilinogen, UA 0.2     Leukocytes, UA Negative     Nitrite, UA Negative     Expiration Date 2,262,024     Lot Number [id number]     POCT "  ruma         Assessment   Diagnoses and all orders for this visit:    Cystitis (Primary)    Dysuria  -     POCT urinalysis dipstick  -     Urine culture    Diverticulitis  Comments:  Flair recently treated and resolved     Psoriatic arthritis (CMS/[facility])  Assessment & Plan:  Follows with rheumatology       Other orders  -     nitrofurantoin, macrocrystal-monohydrate, (MACROBID) 100 mg capsule; Take 1 capsule (100 mg total) by mouth 2 (two) times a day for 7 days.  -     fluconazole (DIFLUCAN) 150 mg tablet; Take one tablet PO once, can repeat dose in 72 hours if symptoms persist      POC UA unremarkable in office, however she did just finish a 10 day course of antibiotic for an unrelated issue  Will treat based on symptoms - Macrobid as prescribed  UA to lab for culture  Fluconazole if she develops vaginal yeast symptoms            Patient has been educated on the risks, benefits, and side effects of all medication prescribed at this visit, and will contact me with any further questions.  Patient expressed understanding and agreement with plan.      Penny Martinez DO  1/8/2024       There are no Patient Instructions on file for this visit.

## 2024-01-09 LAB — SPECIMEN STATUS: NORMAL

## 2024-01-10 ENCOUNTER — TELEPHONE (OUTPATIENT)
Dept: FAMILY MEDICINE | Facility: CLINIC | Age: 64
End: 2024-01-10
Payer: COMMERCIAL

## 2024-01-10 NOTE — TELEPHONE ENCOUNTER
----- Message from Penny Martinez DO sent at 1/10/2024  8:29 AM EST -----  Lab was unable to run her urine for culture.  How is she feeling?  If symptoms are improved, finish antibiotic as prescribed.  If not, come back to give a repeat urine sample for culture

## 2024-01-10 NOTE — TELEPHONE ENCOUNTER
Spoke with patient, letting her know specimen was unable to be processed by lab. Patient notes she is feeling better, per provider instructed to finish out antibiotics and will call back if symptoms return. Also told patient to call us if she receives a bill from [facility] since it was not processed.

## 2024-01-22 ENCOUNTER — TELEPHONE (OUTPATIENT)
Dept: FAMILY MEDICINE | Facility: CLINIC | Age: 64
End: 2024-01-22
Payer: COMMERCIAL

## 2024-04-08 ENCOUNTER — APPOINTMENT (OUTPATIENT)
Dept: LAB | Age: 64
End: 2024-04-08
Attending: FAMILY MEDICINE
Payer: COMMERCIAL

## 2024-04-08 ENCOUNTER — OFFICE VISIT (OUTPATIENT)
Dept: FAMILY MEDICINE | Facility: CLINIC | Age: 64
End: 2024-04-08
Payer: COMMERCIAL

## 2024-04-08 VITALS
BODY MASS INDEX: 34.48 KG/M2 | HEIGHT: 62 IN | HEART RATE: 63 BPM | TEMPERATURE: 98.4 F | SYSTOLIC BLOOD PRESSURE: 122 MMHG | WEIGHT: 187.4 LBS | RESPIRATION RATE: 14 BRPM | OXYGEN SATURATION: 97 % | DIASTOLIC BLOOD PRESSURE: 86 MMHG

## 2024-04-08 DIAGNOSIS — R10.10 UPPER ABDOMINAL PAIN: ICD-10-CM

## 2024-04-08 DIAGNOSIS — R07.9 CHEST PAIN, UNSPECIFIED TYPE: Primary | ICD-10-CM

## 2024-04-08 LAB
ALBUMIN SERPL-MCNC: 4.5 G/DL (ref 3.5–5.7)
ALP SERPL-CCNC: 75 IU/L (ref 34–125)
ALT SERPL-CCNC: 14 IU/L (ref 7–52)
AMYLASE SERPL-CCNC: 55 U/L (ref 29–103)
ANION GAP SERPL CALC-SCNC: 10 MEQ/L (ref 3–15)
AST SERPL-CCNC: 15 IU/L (ref 13–39)
BASOPHILS # BLD: 0.04 K/UL (ref 0.01–0.1)
BASOPHILS NFR BLD: 0.7 %
BILIRUB SERPL-MCNC: 0.6 MG/DL (ref 0.3–1.2)
BUN SERPL-MCNC: 13 MG/DL (ref 7–25)
CALCIUM SERPL-MCNC: 9.9 MG/DL (ref 8.6–10.3)
CHLORIDE SERPL-SCNC: 102 MEQ/L (ref 98–107)
CO2 SERPL-SCNC: 29 MEQ/L (ref 21–31)
CREAT SERPL-MCNC: 1 MG/DL (ref 0.6–1.2)
DIFFERENTIAL METHOD BLD: ABNORMAL
EGFRCR SERPLBLD CKD-EPI 2021: >60 ML/MIN/1.73M*2
EOSINOPHIL # BLD: 0.12 K/UL (ref 0.04–0.36)
EOSINOPHIL NFR BLD: 2 %
ERYTHROCYTE [DISTWIDTH] IN BLOOD BY AUTOMATED COUNT: 13.6 % (ref 11.7–14.4)
GLUCOSE SERPL-MCNC: 85 MG/DL (ref 70–99)
HCT VFR BLD AUTO: 46.1 % (ref 35–45)
HGB BLD-MCNC: 14.6 G/DL (ref 11.8–15.7)
IMM GRANULOCYTES # BLD AUTO: 0.01 K/UL (ref 0–0.08)
IMM GRANULOCYTES NFR BLD AUTO: 0.2 %
LIPASE SERPL-CCNC: 61 U/L (ref 11–82)
LYMPHOCYTES # BLD: 2.3 K/UL (ref 1.2–3.5)
LYMPHOCYTES NFR BLD: 38.2 %
MCH RBC QN AUTO: 29.4 PG (ref 28–33.2)
MCHC RBC AUTO-ENTMCNC: 31.7 G/DL (ref 32.2–35.5)
MCV RBC AUTO: 92.9 FL (ref 83–98)
MONOCYTES # BLD: 0.52 K/UL (ref 0.28–0.8)
MONOCYTES NFR BLD: 8.6 %
NEUTROPHILS # BLD: 3.03 K/UL (ref 1.7–7)
NEUTS SEG NFR BLD: 50.3 %
NRBC BLD-RTO: 0 %
PDW BLD AUTO: 10.4 FL (ref 9.4–12.3)
PLATELET # BLD AUTO: 267 K/UL (ref 150–369)
POTASSIUM SERPL-SCNC: 4.2 MEQ/L (ref 3.5–5.1)
PROT SERPL-MCNC: 7 G/DL (ref 6–8.2)
RBC # BLD AUTO: 4.96 M/UL (ref 3.93–5.22)
SODIUM SERPL-SCNC: 141 MEQ/L (ref 136–145)
WBC # BLD AUTO: 6.02 K/UL (ref 3.8–10.5)

## 2024-04-08 PROCEDURE — 93000 ELECTROCARDIOGRAM COMPLETE: CPT | Performed by: FAMILY MEDICINE

## 2024-04-08 PROCEDURE — 85025 COMPLETE CBC W/AUTO DIFF WBC: CPT

## 2024-04-08 PROCEDURE — 80053 COMPREHEN METABOLIC PANEL: CPT

## 2024-04-08 PROCEDURE — 83690 ASSAY OF LIPASE: CPT

## 2024-04-08 PROCEDURE — 36415 COLL VENOUS BLD VENIPUNCTURE: CPT

## 2024-04-08 PROCEDURE — 82150 ASSAY OF AMYLASE: CPT

## 2024-04-08 PROCEDURE — 99214 OFFICE O/P EST MOD 30 MIN: CPT | Performed by: FAMILY MEDICINE

## 2024-04-08 PROCEDURE — 3008F BODY MASS INDEX DOCD: CPT | Performed by: FAMILY MEDICINE

## 2024-04-08 ASSESSMENT — ENCOUNTER SYMPTOMS
COUGH: 0
ABDOMINAL PAIN: 0
FEVER: 0
SHORTNESS OF BREATH: 0
BACK PAIN: 1
HEADACHES: 0

## 2024-04-08 NOTE — PROGRESS NOTES
[facility] at [facility]     Reason for visit:   Chief Complaint   Patient presents with    pt states pain behind left breast, abdominal pain & fatigue      HPI  Last night she ate some toffee that her daughter made and beef stew and that evening she developed pain on the left side of her chest behind her left breast that radiated to her back. No pain with a deep breath. Rotating her torso makes it worse. She is having some fatigue today. Not exertional. No associated shortness of breath, lightheadedness radiation to arm or neck, diaphoresis or nausea. She ate rice cakes and an egg today without difficulty. She feels that her abdomen has been distended    She wants to use [facility] and reports that she can go there with her insurance      Hilda Webster is a 64 y.o. female      The following have been reviewed and updated as appropriate in this visit  Patient Active Problem List    Diagnosis Date Noted    Pure hypercholesterolemia 2020    Mitral valve disorder 2020    Class 1 obesity due to excess calories without serious comorbidity with body mass index (BMI) of 30.0 to 30.9 in adult 2020    Shortened OR interval 2020    Psoriatic arthritis (CMS/HCC) 2020    Gastroesophageal reflux disease with hiatal hernia 2020    Jonas's esophagus determined by biopsy 2020    Hypothyroidism, postsurgical 2020    Migraine headache 2019    Rosacea 2011    History of thyroid cancer 2011    Vitamin D deficiency 2011     Past Medical History:   Diagnosis Date    COVID-19 2022     Past Surgical History:   Procedure Laterality Date     SECTION      ENDOMETRIAL ABLATION  2012    EYE SURGERY Right 2020    THYROIDECTOMY       Social History     Socioeconomic History    Marital status:      Spouse name: Not on file    Number of children: Not on file    Years of education: Not on file    Highest education level: Not on file    Occupational History    Not on file   Tobacco Use    Smoking status: Former    Smokeless tobacco: Never   Substance and Sexual Activity    Alcohol use: Yes     Comment: rarely/wine    Drug use: Not on file    Sexual activity: Not on file   Other Topics Concern    Not on file   Social History Narrative    Not on file     Social Determinants of Health     Financial Resource Strain: Not on file   Food Insecurity: Not on file   Transportation Needs: Not on file   Physical Activity: Not on file   Stress: Not on file   Social Connections: Not on file   Intimate Partner Violence: Not on file   Housing Stability: Not on file       Family History   Problem Relation Age of Onset    Cervical cancer Biological Mother     Diabetes Biological Father     No Known Problems Biological Sister     Uterine cancer Paternal Grandmother     Lung cancer Paternal Grandfather     Migraines Biological Sister        Allergies   Allergen Reactions    Excedrin Extra Strength [Aspirin-Acetaminophen-Caffeine] Hives     Hives, Facial Swelling     Ibuprofen Hives     Hives, Facial Swelling     Naproxen Hives     Hives, Facial Swelling     Flonase [Fluticasone]        Current Outpatient Medications   Medication Sig Dispense Refill    cannabidiol (CBD) oil 1 each as needed.        ergocalciferol (VITAMIN D2) 50,000 unit(1250 mcg) capsule 1 (ONE) CAPSULE ONCE WEEK      famotidine (PEPCID) 20 mg tablet Take 20 mg by mouth 2 (two) times a day.      levothyroxine (SYNTHROID) 75 mcg tablet Take 75 mcg by mouth every other day. 75 mcg every day day alternate 50 mcg      LUMIFY 0.025 % drops INSTILL 1 DROP INTO BOTH EYES AS DIRECTED      SYNTHROID 50 mcg tablet Take 50 mcg by mouth every other day.         No current facility-administered medications for this visit.     Review of Systems   Constitutional:  Negative for fever.   Respiratory:  Negative for cough and shortness of breath.    Cardiovascular:  Positive for chest pain (see HPI).  "  Gastrointestinal:  Negative for abdominal pain.   Genitourinary:  Negative for decreased urine volume.   Musculoskeletal:  Positive for back pain (see HPI).   Neurological:  Negative for headaches.     Objective   Vitals:    04/08/24 1439   BP: 122/86   BP Location: Right upper arm   Patient Position: Sitting   Pulse: 63   Resp: 14   Temp: 36.9 °C (98.4 °F)   SpO2: 97%   Weight: 85 kg (187 lb 6.4 oz)   Height: 1.575 m (5' 2\")     Body mass index is 34.28 kg/m².    Physical Exam  Constitutional:       General: She is not in acute distress.     Appearance: She is well-developed.   HENT:      Head: Normocephalic and atraumatic.      Right Ear: Tympanic membrane and ear canal normal.      Left Ear: Tympanic membrane and ear canal normal.   Eyes:      General: Lids are normal.      Conjunctiva/sclera: Conjunctivae normal.      Pupils: Pupils are equal, round, and reactive to light.   Neck:      Thyroid: No thyromegaly.   Cardiovascular:      Rate and Rhythm: Normal rate and regular rhythm.      Heart sounds: Normal heart sounds. No murmur heard.     No friction rub. No gallop.   Pulmonary:      Effort: Pulmonary effort is normal.      Breath sounds: Normal breath sounds.      Comments: No left chest wall tenderness  Abdominal:      General: There is no distension.      Palpations: Abdomen is soft. There is no hepatomegaly, splenomegaly or mass.      Tenderness: There is abdominal tenderness (epigastric). There is no guarding or rebound.   Musculoskeletal:      Right lower leg: No edema.      Left lower leg: No edema.      Comments: Mild tenderness over left paraspinal muscles in thoracic region   Lymphadenopathy:      Cervical: No cervical adenopathy.   Skin:     Coloration: Skin is not cyanotic.      Nails: There is no clubbing.   Neurological:      Mental Status: She is alert.       Procedures       POINT OF CARE TESTING:    No results found for this visit on 04/08/24.     Assessment   Diagnoses and all orders for " this visit:    Chest pain, unspecified type (Primary)  Comments:  EKG shows no acute changes. I favor GI etiology, but if worse, to ER  Orders:  -     ECG 12 LEAD OFFICE PERFORMED    Upper abdominal pain  Comments:  check labs as below. To ER with any worsening  Orders:  -     CBC and Differential; Future  -     Comprehensive metabolic panel; Future  -     Amylase; Future  -     Lipase; Future         Educated patient on any new medications/doses that I prescribed today and patient expressed understanding and patient expressed understanding of and agreement with plan  No follow-ups on file.     Lu Genao MD  4/8/2024

## 2024-04-09 ENCOUNTER — TELEPHONE (OUTPATIENT)
Dept: FAMILY MEDICINE | Facility: CLINIC | Age: 64
End: 2024-04-09

## 2024-04-09 NOTE — TELEPHONE ENCOUNTER
----- Message from Lu Genao MD sent at 4/9/2024  8:39 AM EDT -----  Labs look good. How is she feeling?

## 2024-04-09 NOTE — TELEPHONE ENCOUNTER
Relayed lab results to pt. Pt. Feeling a little better and is being very careful about what she is eating as she agrees with PCP that it may be mild esophagitis.

## 2024-06-17 ENCOUNTER — OFFICE VISIT (OUTPATIENT)
Dept: FAMILY MEDICINE | Facility: CLINIC | Age: 64
End: 2024-06-17
Payer: COMMERCIAL

## 2024-06-17 VITALS
TEMPERATURE: 98.2 F | HEIGHT: 62 IN | OXYGEN SATURATION: 96 % | HEART RATE: 66 BPM | SYSTOLIC BLOOD PRESSURE: 119 MMHG | WEIGHT: 189 LBS | DIASTOLIC BLOOD PRESSURE: 82 MMHG | RESPIRATION RATE: 15 BRPM | BODY MASS INDEX: 34.78 KG/M2

## 2024-06-17 DIAGNOSIS — Z00.00 ADULT GENERAL MEDICAL EXAMINATION: Primary | ICD-10-CM

## 2024-06-17 DIAGNOSIS — E55.9 VITAMIN D DEFICIENCY: ICD-10-CM

## 2024-06-17 PROCEDURE — 3008F BODY MASS INDEX DOCD: CPT | Performed by: FAMILY MEDICINE

## 2024-06-17 PROCEDURE — 99396 PREV VISIT EST AGE 40-64: CPT | Performed by: FAMILY MEDICINE

## 2024-06-17 ASSESSMENT — ENCOUNTER SYMPTOMS
HEADACHES: 0
ROS SKIN COMMENTS: NO CHANGE IN MOLES
SHORTNESS OF BREATH: 0
DYSURIA: 0
ROS GI COMMENTS: NO CHANGE IN BOWEL HABITS
SORE THROAT: 0
MYALGIAS: 0
FEVER: 0
DYSPHORIC MOOD: 0
HEMATURIA: 0
ABDOMINAL PAIN: 0
BRUISES/BLEEDS EASILY: 0

## 2024-06-17 ASSESSMENT — PATIENT HEALTH QUESTIONNAIRE - PHQ9: SUM OF ALL RESPONSES TO PHQ9 QUESTIONS 1 & 2: 0

## 2024-06-17 NOTE — PROGRESS NOTES
[facility] at [facility]     Reason for visit:   Chief Complaint   Patient presents with    Annual Exam     Last ECG 2024      HPI  Eats a balanced diet. Does exercise: walks   Sees gyn for routine exams (Marlys Suarez.) last exam Less than 2 year ago and plans to go again this year  Mammogram:   Colonoscopy: 2024  Sees eye doctor very year. Gets regular glaucoma screening.      She does not want the Shingrix vaccine.  She'll wants to think about a Tdap      Hilda Webster is a 64 y.o. female      The following have been reviewed and updated as appropriate in this visit  Patient Active Problem List    Diagnosis Date Noted    Pure hypercholesterolemia 2020    Mitral valve disorder 2020    Class 1 obesity due to excess calories without serious comorbidity with body mass index (BMI) of 30.0 to 30.9 in adult 2020    Shortened SC interval 2020    Psoriatic arthritis (CMS/HCC) 2020    Gastroesophageal reflux disease with hiatal hernia 2020    Jonas's esophagus determined by biopsy 2020    Hypothyroidism, postsurgical 2020    Migraine headache 2019    Rosacea 2011    History of thyroid cancer 2011    Vitamin D deficiency 2011     Past Medical History:   Diagnosis Date    COVID-19 2022     Past Surgical History:   Procedure Laterality Date     SECTION      ENDOMETRIAL ABLATION      EYE SURGERY Right 2020    THYROIDECTOMY       Social History     Socioeconomic History    Marital status:      Spouse name: Not on file    Number of children: Not on file    Years of education: Not on file    Highest education level: Not on file   Occupational History    Not on file   Tobacco Use    Smoking status: Former    Smokeless tobacco: Never   Substance and Sexual Activity    Alcohol use: Yes     Comment: rarely/wine    Drug use: Not on file    Sexual activity: Not on file   Other Topics Concern    Not on file   Social  History Narrative    Not on file     Social Determinants of Health     Financial Resource Strain: Not on file   Food Insecurity: Not on file   Transportation Needs: Not on file   Physical Activity: Not on file   Stress: Not on file   Social Connections: Not on file   Intimate Partner Violence: Not on file   Housing Stability: Not on file       Family History   Problem Relation Age of Onset    Cervical cancer Biological Mother     Diabetes Biological Father     No Known Problems Biological Sister     Uterine cancer Paternal Grandmother     Lung cancer Paternal Grandfather     Migraines Biological Sister        Allergies   Allergen Reactions    Excedrin Extra Strength [Aspirin-Acetaminophen-Caffeine] Hives     Hives, Facial Swelling     Ibuprofen Hives     Hives, Facial Swelling     Naproxen Hives     Hives, Facial Swelling     Flonase [Fluticasone]        Current Outpatient Medications   Medication Sig Dispense Refill    cannabidiol (CBD) oil 1 each as needed.        ergocalciferol (VITAMIN D2) 50,000 unit(1250 mcg) capsule 1 (ONE) CAPSULE ONCE WEEK      famotidine (PEPCID) 20 mg tablet Take 20 mg by mouth 2 (two) times a day.      levothyroxine (SYNTHROID) 75 mcg tablet Take 75 mcg by mouth every other day. 75 mcg every day day alternate 50 mcg      LUMIFY 0.025 % drops INSTILL 1 DROP INTO BOTH EYES AS DIRECTED      SYNTHROID 50 mcg tablet Take 50 mcg by mouth every other day.         No current facility-administered medications for this visit.     Review of Systems   Constitutional:  Negative for fever.   HENT:  Negative for ear pain and sore throat.    Eyes:  Negative for visual disturbance.   Respiratory:  Negative for shortness of breath.    Cardiovascular:  Negative for chest pain.   Gastrointestinal:  Negative for abdominal pain.        No change in bowel habits   Genitourinary:  Negative for dysuria and hematuria.   Musculoskeletal:  Negative for myalgias.   Skin:  Negative for rash.        No change in moles  "  Neurological:  Negative for headaches.   Hematological:  Does not bruise/bleed easily.   Psychiatric/Behavioral:  Negative for dysphoric mood.         PHQ2 score=0     Objective   Vitals:    06/17/24 0817   BP: 119/82   BP Location: Right upper arm   Patient Position: Sitting   Pulse: 66   Resp: 15   Temp: 36.8 °C (98.2 °F)   SpO2: 96%   Weight: 85.7 kg (189 lb)   Height: 1.562 m (5' 1.5\")     Body mass index is 35.13 kg/m².    Vision Screening    Right eye Left eye Both eyes   Without correction 20/25 20/25 20/25   With correction           Physical Exam  Constitutional:       General: She is not in acute distress.     Appearance: She is well-developed.   HENT:      Head: Normocephalic and atraumatic.      Right Ear: Tympanic membrane and ear canal normal.      Left Ear: Tympanic membrane and ear canal normal.   Eyes:      General: Lids are normal.      Extraocular Movements: Extraocular movements intact.      Conjunctiva/sclera: Conjunctivae normal.      Pupils: Pupils are equal, round, and reactive to light.      Comments: Fundi WNL   Neck:      Thyroid: No thyromegaly.   Cardiovascular:      Rate and Rhythm: Normal rate and regular rhythm.      Heart sounds: No murmur heard.     No friction rub. No gallop.      Comments: Carotids 2+ bilaterally. No bruits  Pulmonary:      Effort: Pulmonary effort is normal.      Breath sounds: Normal breath sounds.      Comments: Breasts: no mass or axillary lymphadenopathy bilaterally    Abdominal:      General: Bowel sounds are normal. There is no distension.      Palpations: Abdomen is soft. There is no hepatomegaly, splenomegaly or mass.      Tenderness: There is no abdominal tenderness. There is no right CVA tenderness or left CVA tenderness.   Musculoskeletal:         General: No deformity.      Right lower leg: No edema.      Left lower leg: No edema.   Lymphadenopathy:      Cervical: No cervical adenopathy.   Skin:     Coloration: Skin is not cyanotic.      Findings: " No rash.      Nails: There is no clubbing.      Comments: No suspicious lesions   Neurological:      Mental Status: She is alert.      Deep Tendon Reflexes: Babinski sign absent on the right side. Babinski sign absent on the left side.      Reflex Scores:       Bicep reflexes are 2+ on the right side and 2+ on the left side.       Brachioradialis reflexes are 2+ on the right side and 2+ on the left side.       Patellar reflexes are 2+ on the right side and 2+ on the left side.       Achilles reflexes are 2+ on the right side and 2+ on the left side.     Comments: Cranial Nerves II-XII grossly intact  Motor grossly normal       Procedures       POINT OF CARE TESTING:    No results found for this visit on 06/17/24.     Assessment   Diagnoses and all orders for this visit:    Adult general medical examination (Primary)  Comments:  well patient. Check labs as below  Orders:  -     CBC and Differential; Future  -     Comprehensive metabolic panel; Future  -     Lipid panel; Future  -     TSH 3rd Generation; Future    Vitamin D deficiency  Assessment & Plan:  Check level    Orders:  -     Vitamin D 25 hydroxy; Future         Educated patient on any new medications/doses that I prescribed today and patient expressed understanding and patient expressed understanding of and agreement with plan  No follow-ups on file.     Lu Genao MD  6/17/2024

## 2024-06-19 ENCOUNTER — TELEPHONE (OUTPATIENT)
Dept: FAMILY MEDICINE | Facility: CLINIC | Age: 64
End: 2024-06-19
Payer: COMMERCIAL

## 2024-06-19 NOTE — LETTER
Preventive Exam Fax Back Request   Date:24     To:  [facility]      FAX#:  [phone]    From: Lu Genao MD/Clinical Team  [facility] in [place] [address]   Phone: [phone]  Fax: [phone]    RE:  Hilda Webster (: 1960)     We are reaching out to you because our mutual patient, Hilda Webster (: 1960), reported they had the preventive procedure(s) indicated below performed at your facility:     Mammogram    Please fax the most recent results to our office with this Cover Sheet.  (If no results are found, please check the appropriate box below)  ? - No results found  ? - Results attached    Please fax to: FAX# [phone]

## 2024-06-19 NOTE — TELEPHONE ENCOUNTER
----- Message from Lu Genao MD sent at 6/17/2024  8:28 AM EDT -----  Please get 2023 mammogram from [facility]

## 2024-07-10 ENCOUNTER — TELEPHONE (OUTPATIENT)
Dept: FAMILY MEDICINE | Facility: CLINIC | Age: 64
End: 2024-07-10
Payer: COMMERCIAL

## 2024-07-10 DIAGNOSIS — E78.00 PURE HYPERCHOLESTEROLEMIA: Primary | ICD-10-CM

## 2024-07-10 DIAGNOSIS — R73.09 ELEVATED GLUCOSE: ICD-10-CM

## 2024-07-10 LAB
25(OH)D3+25(OH)D2 SERPL-MCNC: 35.2 NG/ML (ref 30–100)
ALBUMIN SERPL-MCNC: 4 G/DL (ref 3.9–4.9)
ALP SERPL-CCNC: 96 IU/L (ref 44–121)
ALT SERPL-CCNC: 14 IU/L (ref 0–32)
AST SERPL-CCNC: 17 IU/L (ref 0–40)
BASOPHILS # BLD AUTO: 0.1 X10E3/UL (ref 0–0.2)
BASOPHILS NFR BLD AUTO: 1 %
BILIRUB SERPL-MCNC: 0.5 MG/DL (ref 0–1.2)
BUN SERPL-MCNC: 13 MG/DL (ref 8–27)
BUN/CREAT SERPL: 14 (ref 12–28)
CALCIUM SERPL-MCNC: 9.2 MG/DL (ref 8.7–10.3)
CHLORIDE SERPL-SCNC: 103 MMOL/L (ref 96–106)
CHOLEST SERPL-MCNC: 262 MG/DL (ref 100–199)
CO2 SERPL-SCNC: 23 MMOL/L (ref 20–29)
CREAT SERPL-MCNC: 0.95 MG/DL (ref 0.57–1)
EGFRCR SERPLBLD CKD-EPI 2021: 68 ML/MIN/1.73
EOSINOPHIL # BLD AUTO: 0.2 X10E3/UL (ref 0–0.4)
EOSINOPHIL NFR BLD AUTO: 4 %
ERYTHROCYTE [DISTWIDTH] IN BLOOD BY AUTOMATED COUNT: 12.9 % (ref 11.7–15.4)
GLOBULIN SER CALC-MCNC: 2.3 G/DL (ref 1.5–4.5)
GLUCOSE SERPL-MCNC: 102 MG/DL (ref 70–99)
HCT VFR BLD AUTO: 43.5 % (ref 34–46.6)
HDLC SERPL-MCNC: 71 MG/DL
HGB BLD-MCNC: 14.3 G/DL (ref 11.1–15.9)
IMM GRANULOCYTES # BLD AUTO: 0 X10E3/UL (ref 0–0.1)
IMM GRANULOCYTES NFR BLD AUTO: 1 %
LDLC SERPL CALC-MCNC: 172 MG/DL (ref 0–99)
LYMPHOCYTES # BLD AUTO: 1.4 X10E3/UL (ref 0.7–3.1)
LYMPHOCYTES NFR BLD AUTO: 33 %
MCH RBC QN AUTO: 29.4 PG (ref 26.6–33)
MCHC RBC AUTO-ENTMCNC: 32.9 G/DL (ref 31.5–35.7)
MCV RBC AUTO: 89 FL (ref 79–97)
MONOCYTES # BLD AUTO: 0.4 X10E3/UL (ref 0.1–0.9)
MONOCYTES NFR BLD AUTO: 9 %
NEUTROPHILS # BLD AUTO: 2.3 X10E3/UL (ref 1.4–7)
NEUTROPHILS NFR BLD AUTO: 52 %
PLATELET # BLD AUTO: 237 X10E3/UL (ref 150–450)
POTASSIUM SERPL-SCNC: 4.7 MMOL/L (ref 3.5–5.2)
PROT SERPL-MCNC: 6.3 G/DL (ref 6–8.5)
RBC # BLD AUTO: 4.87 X10E6/UL (ref 3.77–5.28)
SODIUM SERPL-SCNC: 138 MMOL/L (ref 134–144)
TRIGL SERPL-MCNC: 110 MG/DL (ref 0–149)
TSH SERPL DL<=0.005 MIU/L-ACNC: 1.51 UIU/ML (ref 0.45–4.5)
VLDLC SERPL CALC-MCNC: 19 MG/DL (ref 5–40)
WBC # BLD AUTO: 4.3 X10E3/UL (ref 3.4–10.8)

## 2024-07-10 NOTE — TELEPHONE ENCOUNTER
----- Message from Lu Genao MD sent at 7/10/2024 11:29 AM EDT -----  Labs show that her LDL cholesterol is high at 172 (and higher than it was in the past). Her glucose is very slightly elevated at 102. Try to eat a low fat low carbohydrate diet and check CMP Lipids A1C 6 months.

## 2024-07-10 NOTE — TELEPHONE ENCOUNTER
Relayed lab results to pt. Pt. Verbalized an understanding of and is in agreement with plan of care at this time. Lab slips pended.

## 2024-12-20 ENCOUNTER — NURSE TRIAGE (OUTPATIENT)
Dept: FAMILY MEDICINE | Facility: CLINIC | Age: 64
End: 2024-12-20
Payer: COMMERCIAL

## 2024-12-20 NOTE — TELEPHONE ENCOUNTER
Regarding: dizziness  ----- Message from Luke HUIZAR sent at 12/20/2024  9:26 AM EST -----  Patient called today with red flag complaint of dizziness, breathless.  Call transferred to Primary Care Nurse Triage Line

## 2024-12-20 NOTE — TELEPHONE ENCOUNTER
Patient transferred to the Primary Care Telephonic Nurse Triage Line with complaints of dizziness.    HPI:  Patient calling and reporting that she woke with dizziness this morning described to be more spinning.  She went to the bathroom and once she sat on the toilet she became very sweaty and nauseous and vomiting once.  She then proceeded to have a BM.      The patient found when she went back to bed, the spinning returned when she was lying down and resolved when she sits upward.  No return of dizziness with turning her head quickly or looking downward.  Unknown blood pressure of heart rate.    History of a vasovagal syncope episode several years ago.    The patient expressed concern over possibly being a little dehydrated.    Denies fever, chest pain, diarrhea or bleeding.    No dizziness at time of triage.  Offered [facility] appointment but patient refused.  Agreed to go to  if symptoms returned.     Disposition:  Go to Urgent Care Now (overriding See Today in Office)    Care Advice:  Care Advice Given    Patient/Caregiver understands and will follow care advice?: Yes, plans to follow advice      Given By Given At Modified    Nika Hunter 12/20/2024  9:51 AM Yes           Disposition and First Aid    SEE IN OFFICE OR VIDEO VISIT TODAY:  * You need to be examined today or have a video telemedicine visit.  * IF NO AVAILABLE OFFICE OR VIDEO APPOINTMENTS: You need to be seen in an Urgent Care Center. Go there today. A nearby Urgent Care Center is often a good source of care. Another choice juan j to go to the Emergency Department.    Nika Hunter 12/20/2024  9:51 AM No           Dizziness From Not Drinking Enough Liquids    DRINK FLUIDS:  * Drink several glasses of fruit juice, other clear fluids or water.  * This will improve hydration and blood sugar levels.  * If the weather is hot or you have a fever, make sure the fluids are cold.    Nika Hunter 12/20/2024  9:51 AM No           Dizziness From  "Standing    SIT UP SLOWLY BEFORE STANDING:  * In the mornings, sit up for a few minutes before you stand up. This helps your blood flow adjust to the change in position.  * If you have to stand for a long time, move your leg muscles to help pump the blood back to the heart.  * Sit down or lie down if you feel dizzy.    Nika Hunter 12/20/2024  9:51 AM No           Dizziness From Standing    CALL BACK IF:  * After 2 hours of rest and fluids AND you are still feeling dizzy  * You pass out (faint) or are too weak to stand  * You become worse           Reason for Disposition   Vomiting occurs with dizziness    Answer Assessment - Initial Assessment Questions  1. DESCRIPTION: \"Describe your dizziness.\"      Room is spinning  2. LIGHTHEADED: \"Do you feel lightheaded?\" (e.g., somewhat faint, woozy, weak upon standing)      No lightheadedness  3. VERTIGO: \"Do you feel like either you or the room is spinning or tilting?\" (i.e., vertigo)      spinning  4. SEVERITY: \"How bad is it?\"  \"Do you feel like you are going to faint?\" \"Can you stand and walk?\"      N/a  5. ONSET:  \"When did the dizziness begin?\"      This morning  6. AGGRAVATING FACTORS: \"Does anything make it worse?\" (e.g., standing, change in head position)      Lying down causes the dizziness      Tipping the head back causes dizziness at times.  7. HEART RATE: \"Can you tell me your heart rate?\" \"How many beats in 15 seconds?\"  (Note: Not all patients can do this.)        unknown  8. CAUSE: \"What do you think is causing the dizziness?\" (e.g., decreased fluids or food, diarrhea, emotional distress, heat exposure, new medicine, sudden standing, vomiting; unknown)      Possible dehydration  9. RECURRENT SYMPTOM: \"Have you had dizziness before?\" If Yes, ask: \"When was the last time?\" \"What happened that time?\"      Several years ago the patient had a syncopal episode that was believed to be vasovagal.  10. OTHER SYMPTOMS: \"Do you have any other symptoms?\" (e.g., " "fever, chest pain, vomiting, diarrhea, bleeding)        Denies fever, chest pain, diarrhea or bleeding.  11. PREGNANCY: \"Is there any chance you are pregnant?\" \"When was your last menstrual period?\"        N/a    Protocols used: Dizziness-Adult-[place]    "